# Patient Record
Sex: FEMALE | Race: BLACK OR AFRICAN AMERICAN | NOT HISPANIC OR LATINO | Employment: FULL TIME | URBAN - METROPOLITAN AREA
[De-identification: names, ages, dates, MRNs, and addresses within clinical notes are randomized per-mention and may not be internally consistent; named-entity substitution may affect disease eponyms.]

---

## 2017-05-06 ENCOUNTER — HOSPITAL ENCOUNTER (EMERGENCY)
Facility: OTHER | Age: 22
Discharge: HOME | End: 2017-05-06
Attending: INTERNAL MEDICINE

## 2017-05-06 VITALS
HEART RATE: 80 BPM | WEIGHT: 154 LBS | TEMPERATURE: 99 F | HEIGHT: 67 IN | BODY MASS INDEX: 24.17 KG/M2 | SYSTOLIC BLOOD PRESSURE: 119 MMHG | DIASTOLIC BLOOD PRESSURE: 87 MMHG | RESPIRATION RATE: 17 BRPM | OXYGEN SATURATION: 100 %

## 2017-05-06 DIAGNOSIS — J00 ACUTE NASOPHARYNGITIS: Primary | ICD-10-CM

## 2017-05-06 PROCEDURE — 99283 EMERGENCY DEPT VISIT LOW MDM: CPT

## 2017-05-06 RX ORDER — BENZONATATE 100 MG/1
200 CAPSULE ORAL 3 TIMES DAILY PRN
Qty: 20 CAPSULE | Refills: 0 | Status: SHIPPED | OUTPATIENT
Start: 2017-05-06 | End: 2017-05-16

## 2017-05-06 RX ORDER — FLUTICASONE PROPIONATE 50 MCG
2 SPRAY, SUSPENSION (ML) NASAL DAILY
Qty: 15 G | Refills: 0 | Status: SHIPPED | OUTPATIENT
Start: 2017-05-06 | End: 2023-06-15

## 2017-05-06 RX ORDER — AZELASTINE 1 MG/ML
2 SPRAY, METERED NASAL 2 TIMES DAILY
Qty: 30 ML | Refills: 0 | Status: SHIPPED | OUTPATIENT
Start: 2017-05-06 | End: 2023-06-15

## 2017-05-06 NOTE — ED PROVIDER NOTES
Encounter Date: 5/6/2017       History     Chief Complaint   Patient presents with    Sore Throat     sore throat x 2 days, with ear pain     Review of patient's allergies indicates:  No Known Allergies  Patient is a 22 y.o. female presenting with the following complaint: URI. The history is provided by the patient. No  was used.   URI   The primary symptoms include sore throat and cough.   The sore throat began yesterday. The sore throat has been unchanged since its onset. The sore throat is accompanied by trouble swallowing and hoarse voice. The sore throat is not accompanied by drooling or stridor. The sore throat pain is at a severity of 2/10.   The onset of the illness is associated with exposure to sick contacts. Symptoms associated with the illness include sinus pressure, congestion and rhinorrhea. The illness is not associated with chills or facial pain.     History reviewed. No pertinent past medical history.  History reviewed. No pertinent surgical history.  History reviewed. No pertinent family history.  Social History   Substance Use Topics    Smoking status: Never Smoker    Smokeless tobacco: None    Alcohol use No     Review of Systems   Constitutional: Negative.  Negative for chills.   HENT: Positive for congestion, hoarse voice, postnasal drip, rhinorrhea, sinus pressure, sore throat and trouble swallowing. Negative for drooling and facial swelling.    Eyes: Negative.    Respiratory: Positive for cough. Negative for stridor.    Cardiovascular: Negative.    Gastrointestinal: Negative.    Endocrine: Negative.    Musculoskeletal: Negative.    Skin: Negative.    Allergic/Immunologic: Negative.    Neurological: Negative.    Hematological: Negative.    Psychiatric/Behavioral: Negative.        Physical Exam   Initial Vitals   BP Pulse Resp Temp SpO2   05/06/17 0132 05/06/17 0132 05/06/17 0132 05/06/17 0132 05/06/17 0132   129/87 90 17 99.3 °F (37.4 °C) 99 %     Physical  Exam    Nursing note and vitals reviewed.  Constitutional: She appears well-developed and well-nourished.   HENT:   Head: Normocephalic and atraumatic.   Right Ear: External ear normal.   Left Ear: External ear normal.   Clear postnasal drip with nasal congestion and oropharyngeal erythema without edema or exudate   Eyes: Conjunctivae and EOM are normal. Pupils are equal, round, and reactive to light.   Neck: Normal range of motion. Neck supple.   Cardiovascular: Normal rate and regular rhythm.   Pulmonary/Chest: Breath sounds normal. No respiratory distress. She has no wheezes. She has no rales.   Abdominal: Soft. Bowel sounds are normal.   Musculoskeletal: Normal range of motion.   Neurological: She is alert and oriented to person, place, and time.   Skin: Skin is warm and dry.   Psychiatric: She has a normal mood and affect.         ED Course   Procedures  Labs Reviewed - No data to display          Medical Decision Making:   Initial Assessment:   22-year-old female presents to the emergency department with sore throat ×2 days with cold symptoms  Differential Diagnosis:   Acute nasopharyngitis  Acute strep pharyngitis  Influenza  ED Management:  Patient given prescription for Tessalon Perles, intranasal fluticasone and Astelin    Labs Reviewed  No results found for any previous visit.     Imaging Reviewed    Imaging Results     None          Medications given in ED    Medications - No data to display    Discharge Medications     Discharge Medication List as of 5/6/2017  2:22 AM      START taking these medications    Details   azelastine (ASTELIN) 137 mcg (0.1 %) nasal spray 2 sprays (274 mcg total) by Nasal route 2 (two) times daily., Starting 5/6/2017, Until Sun 5/6/18, Normal      benzonatate (TESSALON) 100 MG capsule Take 2 capsules (200 mg total) by mouth 3 (three) times daily as needed for Cough., Starting 5/6/2017, Until Tue 5/16/17, Normal      fluticasone (FLONASE) 50 mcg/actuation nasal spray 2 sprays by  Each Nare route once daily., Starting 5/6/2017, Until Discontinued, Normal                   Patient discharged to home in stable condition with instructions to:   1. Please take all meds as prescribed.  2. Follow-up with your primary care doctor   3. Return precautions discussed and patient and/or family/caretaker understands to return to the emergency room for any concerns including worsening of your current symptoms, fever, chills, night sweats, worsening pain, chest pain, shortness of breath, nausea, vomiting, diarrhea, bleeding, headache, difficulty talking, visual disturbances, weakness, numbness or any other acute concerns                   ED Course     Clinical Impression:   The primary diagnosis is acute nasopharyngitis  Disposition:   Disposition: Discharged  Condition: Stable       Darian Simon MD  05/06/17 4899

## 2017-05-06 NOTE — DISCHARGE INSTRUCTIONS
Viral Upper Respiratory Illness (Adult)  You have a viral upper respiratory illness (URI), which is another term for the common cold. This illness is contagious during the first few days. It is spread through the air by coughing and sneezing. It may also be spread by direct contact (touching the sick person and then touching your own eyes, nose, or mouth). Frequent handwashing will decrease risk of spread. Most viral illnesses go away within 7 to 10 days with rest and simple home remedies. Sometimes the illness may last for several weeks. Antibiotics will not kill a virus, and they are generally not prescribed for this condition.    Home care  · If symptoms are severe, rest at home for the first 2 to 3 days. When you resume activity, don't let yourself get too tired.  · Avoid being exposed to cigarette smoke (yours or others).  · You may use acetaminophen or ibuprofen to control pain and fever, unless another medicine was prescribed. (Note: If you have chronic liver or kidney disease, have ever had a stomach ulcer or gastrointestinal bleeding, or are taking blood-thinning medicines, talk with your healthcare provider before using these medicines.) Aspirin should never be given to anyone under 18 years of age who is ill with a viral infection or fever. It may cause severe liver or brain damage.  · Your appetite may be poor, so a light diet is fine. Avoid dehydration by drinking 6 to 8 glasses of fluids per day (water, soft drinks, juices, tea, or soup). Extra fluids will help loosen secretions in the nose and lungs.  · Over-the-counter cold medicines will not shorten the length of time youre sick, but they may be helpful for the following symptoms: cough, sore throat, and nasal and sinus congestion. (Note: Do not use decongestants if you have high blood pressure.)  Follow-up care  Follow up with your healthcare provider, or as advised.  When to seek medical advice  Call your healthcare provider right away if  any of these occur:  · Cough with lots of colored sputum (mucus)  · Severe headache; face, neck, or ear pain  · Difficulty swallowing due to throat pain  · Fever of 100.4°F (38°C)  Call 911, or get immediate medical care  Call emergency services right away if any of these occur:  · Chest pain, shortness of breath, wheezing, or difficulty breathing  · Coughing up blood  · Inability to swallow due to throat pain  Date Last Reviewed: 9/13/2015 © 2000-2016 [employer]. [address] [place] [id number]. All rights reserved. This information is not intended as a substitute for professional medical care. Always follow your healthcare professional's instructions.

## 2017-05-06 NOTE — ED AVS SNAPSHOT
[facility] EMERGENCY DEPARTMENT  [address]  [place] [address]               Michelle Burns   2017  1:31 AM   ED    Description:  Female : 1995   Department:  [facility] Emergency Department           Your Care was Coordinated By:     Provider Role From To    Darian Simon MD Attending Provider 17 0140 --      Reason for Visit     Sore Throat           Diagnoses this Visit        Comments    Acute nasopharyngitis    -  Primary       ED Disposition     ED Disposition Condition Comment    Discharge             To Do List           Follow-up Information     Follow up with Primary Doctor No In 3 day(s).       These Medications        Disp Refills Start End    benzonatate (TESSALON) 100 MG capsule 20 capsule 0 2017    Take 2 capsules (200 mg total) by mouth 3 (three) times daily as needed for Cough. - Oral    [facility] [address] [place] [address] AT [facility] Ph #: [phone]       azelastine (ASTELIN) 137 mcg (0.1 %) nasal spray 30 mL 0 2017    2 sprays (274 mcg total) by Nasal route 2 (two) times daily. - Nasal    Pharmacy: [facility] [address] [place], [place] [address] AT [facility] #: [phone]       fluticasone (FLONASE) 50 mcg/actuation nasal spray 15 g 0 2017     2 sprays by Each Nare route once daily. - Each Nare    Pharmacy: [facility] [address] [place], [place] [address] #: [phone]         [facility]     [facility] Care [facility] - 24 Assistance  Unless otherwise directed by your provider, please contact [facility], our nurse care line that is available for 24 assistance.     Registered nurses in the [facility] provide: appointment scheduling, clinical advisement, health education, and other advisory services.  Call: [phone] (toll free)               Medications          "  Message regarding Medications     Verify the changes and/or additions to your medication regime listed below are the same as discussed with your clinician today.  If any of these changes or additions are incorrect, please notify your healthcare provider.        START taking these NEW medications        Refills    benzonatate (TESSALON) 100 MG capsule 0    Sig: Take 2 capsules (200 mg total) by mouth 3 (three) times daily as needed for Cough.    Class: Normal    Route: Oral    azelastine (ASTELIN) 137 mcg (0.1 %) nasal spray 0    Si sprays (274 mcg total) by Nasal route 2 (two) times daily.    Class: Normal    Route: Nasal    fluticasone (FLONASE) 50 mcg/actuation nasal spray 0    Si sprays by Each Nare route once daily.    Class: Normal    Route: Each Nare           Verify that the below list of medications is an accurate representation of the medications you are currently taking.  If none reported, the list may be blank. If incorrect, please contact your healthcare provider. Carry this list with you in case of emergency.           Current Medications     azelastine (ASTELIN) 137 mcg (0.1 %) nasal spray 2 sprays (274 mcg total) by Nasal route 2 (two) times daily.    benzonatate (TESSALON) 100 MG capsule Take 2 capsules (200 mg total) by mouth 3 (three) times daily as needed for Cough.    fluticasone (FLONASE) 50 mcg/actuation nasal spray 2 sprays by Each Nare route once daily.           Clinical Reference Information           Your Vitals Were     BP Pulse Temp Resp Height Weight    129/87 90 99.3 °F (37.4 °C) (Temporal) 17 5' 7" (1.702 m) 69.9 kg (154 lb)    Last Period SpO2 BMI          2017 99% 24.12 kg/m2        Allergies as of 2017     No Known Allergies      Immunizations Administered on Date of Encounter - 2017     None      ED Micro, Lab, POCT     None      ED Imaging Orders     None        Discharge Instructions           Viral Upper Respiratory Illness (Adult)  You have a viral " upper respiratory illness (URI), which is another term for the common cold. This illness is contagious during the first few days. It is spread through the air by coughing and sneezing. It may also be spread by direct contact (touching the sick person and then touching your own eyes, nose, or mouth). Frequent handwashing will decrease risk of spread. Most viral illnesses go away within 7 to 10 days with rest and simple home remedies. Sometimes the illness may last for several weeks. Antibiotics will not kill a virus, and they are generally not prescribed for this condition.    Home care  · If symptoms are severe, rest at home for the first 2 to 3 days. When you resume activity, don't let yourself get too tired.  · Avoid being exposed to cigarette smoke (yours or others).  · You may use acetaminophen or ibuprofen to control pain and fever, unless another medicine was prescribed. (Note: If you have chronic liver or kidney disease, have ever had a stomach ulcer or gastrointestinal bleeding, or are taking blood-thinning medicines, talk with your healthcare provider before using these medicines.) Aspirin should never be given to anyone under 18 years of age who is ill with a viral infection or fever. It may cause severe liver or brain damage.  · Your appetite may be poor, so a light diet is fine. Avoid dehydration by drinking 6 to 8 glasses of fluids per day (water, soft drinks, juices, tea, or soup). Extra fluids will help loosen secretions in the nose and lungs.  · Over-the-counter cold medicines will not shorten the length of time youre sick, but they may be helpful for the following symptoms: cough, sore throat, and nasal and sinus congestion. (Note: Do not use decongestants if you have high blood pressure.)  Follow-up care  Follow up with your healthcare provider, or as advised.  When to seek medical advice  Call your healthcare provider right away if any of these occur:  · Cough with lots of colored sputum  (mucus)  · Severe headache; face, neck, or ear pain  · Difficulty swallowing due to throat pain  · Fever of 100.4°F (38°C)  Call 911, or get immediate medical care  Call emergency services right away if any of these occur:  · Chest pain, shortness of breath, wheezing, or difficulty breathing  · Coughing up blood  · Inability to swallow due to throat pain  Date Last Reviewed: 9/13/2015  © 7184-6785 [employer]. [address] [place] [address], [place] [address]. All rights reserved. This information is not intended as a substitute for professional medical care. Always follow your healthcare professional's instructions.          [facility] Sign-Up     Activating your [facility] account is as easy as 1-2-3!     1) Visit [email or web address], select Sign Up Now, enter this activation code and your date of birth, then select Next.  [email or web address]  Expires: 6/20/2017  2:22 AM      2) Create a username and password to use when you visit [facility] in the future and select a security question in case you lose your password and select Next.    3) Enter your e-mail address and click Sign Up!    Additional Information  If you have questions, please e-mail [email or web address] or call [phone] to talk to our [facility] staff. Remember, [facility] is NOT to be used for urgent needs. For medical emergencies, dial 911.          [facility] Emergency Department complies with applicable Federal civil rights laws and does not discriminate on the basis of race, color, national origin, age, disability, or sex.        Language Assistance Services     ATTENTION: Language assistance services are available, free of charge. Please call [phone].      ATENCIÓN: Si habla español, tiene a mora disposición servicios gratuitos de asistencia lingüística. Llame al [phone].     CHÚ Ý: N?u b?n nói Ti?ng Vi?t, có các d?ch v? h? tr? ngôn ng? mi?n phí dành cho b?n. G?i s? [phone].

## 2020-12-21 ENCOUNTER — OFFICE VISIT (OUTPATIENT)
Dept: URGENT CARE | Facility: CLINIC | Age: 25
End: 2020-12-21
Payer: COMMERCIAL

## 2020-12-21 VITALS
BODY MASS INDEX: 24.17 KG/M2 | DIASTOLIC BLOOD PRESSURE: 70 MMHG | HEART RATE: 66 BPM | HEIGHT: 67 IN | TEMPERATURE: 98 F | OXYGEN SATURATION: 98 % | WEIGHT: 154 LBS | SYSTOLIC BLOOD PRESSURE: 102 MMHG

## 2020-12-21 DIAGNOSIS — R43.0 ANOSMIA: Primary | ICD-10-CM

## 2020-12-21 DIAGNOSIS — Z20.822 EXPOSURE TO COVID-19 VIRUS: ICD-10-CM

## 2020-12-21 DIAGNOSIS — J06.9 UPPER RESPIRATORY TRACT INFECTION, UNSPECIFIED TYPE: ICD-10-CM

## 2020-12-21 LAB
CTP QC/QA: YES
SARS-COV-2 RDRP RESP QL NAA+PROBE: NEGATIVE

## 2020-12-21 PROCEDURE — U0002 COVID-19 LAB TEST NON-CDC: HCPCS | Mod: QW,S$GLB,, | Performed by: NURSE PRACTITIONER

## 2020-12-21 PROCEDURE — 99203 OFFICE O/P NEW LOW 30 MIN: CPT | Mod: S$GLB,,, | Performed by: NURSE PRACTITIONER

## 2020-12-21 PROCEDURE — 99203 PR OFFICE/OUTPT VISIT, NEW, LEVL III, 30-44 MIN: ICD-10-PCS | Mod: S$GLB,,, | Performed by: NURSE PRACTITIONER

## 2020-12-21 PROCEDURE — 3008F PR BODY MASS INDEX (BMI) DOCUMENTED: ICD-10-PCS | Mod: CPTII,S$GLB,, | Performed by: NURSE PRACTITIONER

## 2020-12-21 PROCEDURE — U0002: ICD-10-PCS | Mod: QW,S$GLB,, | Performed by: NURSE PRACTITIONER

## 2020-12-21 PROCEDURE — 3008F BODY MASS INDEX DOCD: CPT | Mod: CPTII,S$GLB,, | Performed by: NURSE PRACTITIONER

## 2020-12-21 NOTE — PROGRESS NOTES
"Subjective:       Patient ID: Michelle Burns is a 25 y.o. female.    Vitals:  height is 5' 7" (1.702 m) and weight is 69.9 kg (154 lb). Her temperature is 98.1 °F (36.7 °C). Her blood pressure is 102/70 and her pulse is 66. Her oxygen saturation is 98%.     Chief Complaint: URI    Pt states for a week she has been having a cough, post nasal drip, nasal congestion, headache. She noticed this AM that she couldn't taste much. Didn't taste or smell her coffee this morning. She was exposed to covid yesterday through a coworker. No fever, chills, CP, SOB.     URI   This is a new problem. The current episode started in the past 7 days. The problem has been gradually worsening. There has been no fever. Associated symptoms include congestion, coughing, headaches and sneezing. Pertinent negatives include no ear pain, nausea, rash, sinus pain, sore throat, vomiting or wheezing. She has tried nothing for the symptoms.       Constitution: Negative for chills, sweating, fatigue and fever.   HENT: Positive for congestion and postnasal drip. Negative for ear pain, sinus pain, sinus pressure, sore throat and voice change.    Neck: Negative for painful lymph nodes.   Eyes: Negative for eye redness.   Respiratory: Positive for cough. Negative for chest tightness, sputum production, bloody sputum, COPD, shortness of breath, stridor, wheezing and asthma.    Gastrointestinal: Negative for nausea and vomiting.   Musculoskeletal: Negative for muscle ache.   Skin: Negative for rash.   Allergic/Immunologic: Positive for sneezing. Negative for seasonal allergies and asthma.   Neurological: Positive for headaches.   Hematologic/Lymphatic: Negative for swollen lymph nodes.       Objective:      Physical Exam   Constitutional: She is oriented to person, place, and time. She appears well-developed. She is cooperative.  Non-toxic appearance. She does not appear ill. No distress.   HENT:   Head: Normocephalic and atraumatic.   Ears:   Right Ear: " Hearing, tympanic membrane, external ear and ear canal normal.   Left Ear: Hearing, tympanic membrane, external ear and ear canal normal.   Nose: Nose normal. No mucosal edema, rhinorrhea or nasal deformity. No epistaxis. Right sinus exhibits no maxillary sinus tenderness and no frontal sinus tenderness. Left sinus exhibits no maxillary sinus tenderness and no frontal sinus tenderness.   Mouth/Throat: Uvula is midline, oropharynx is clear and moist and mucous membranes are normal. Mucous membranes are moist. No trismus in the jaw. Normal dentition. No uvula swelling. No oropharyngeal exudate, posterior oropharyngeal edema or posterior oropharyngeal erythema. Oropharynx is clear.   Eyes: Pupils are equal, round, and reactive to light. Conjunctivae and lids are normal. No scleral icterus.   Neck: Trachea normal, normal range of motion, full passive range of motion without pain and phonation normal. Neck supple. No neck rigidity. No edema and no erythema present.   Cardiovascular: Normal rate, regular rhythm, normal heart sounds and normal pulses.   Pulmonary/Chest: Effort normal and breath sounds normal. No respiratory distress. She has no decreased breath sounds. She has no wheezes. She has no rhonchi. She has no rales.   Abdominal: Normal appearance.   Musculoskeletal: Normal range of motion.         General: No deformity.   Lymphadenopathy:     She has no cervical adenopathy.   Neurological: She is alert and oriented to person, place, and time. She exhibits normal muscle tone. Coordination normal.   Skin: Skin is warm, dry, intact, not diaphoretic and not pale. Psychiatric: Her speech is normal and behavior is normal. Judgment and thought content normal.   Nursing note and vitals reviewed.    Results for orders placed or performed in visit on 12/21/20   POCT COVID-19 Rapid Screening   Result Value Ref Range    POC Rapid COVID Negative Negative     Acceptable Yes            Assessment:       1.  "Anosmia    2. Exposure to COVID-19 virus    3. Upper respiratory tract infection, unspecified type        Plan:         Anosmia    Exposure to COVID-19 virus    Upper respiratory tract infection, unspecified type  -     POCT COVID-19 Rapid Screening         Reviewed previous pertinent office visits, PMH, PSH, fam hx  Rapid covid test negative but she just today developed anosmia. Concern for false negative. Recommended she quarantine for now and f/u for retesting if anosmia persists  We discussed that this is likely a viral illness and will not benefit from antibiotics. Symptomatic care recommended.   Advised on return/follow-up precautions. Advised on ER precautions. Answered all patient questions. Patient verbalized understanding and voiced agreement with current treatment plan.    Patient Instructions   You have tested negative for COVID-19 today.  If you did not have a close exposure (as defined below) you can return to your normal daily activities to include social distancing, wearing a mask and frequent handwashing.    A "close exposure" is defined as anyone who has had an exposure (masked or unmasked) to a known COVID -19 positive person within 6 ft for longer than 15 minutes. If your exposure meets this definition, you are required by CDC guidelines to quarantine for at least 7-10 days from time of exposure.    The CDC states that a test can be performed for an asymptomatic patient (someone who does not have any symptoms) after a close exposure, and that a test should be done if you develop symptoms after a close exposure as described above.    Specifically, you can test at day 5 or later if asymptomatic in order to get released from quarantine on day 7 or later.  If you develop symptoms sooner, you should test when your symptoms start.  If you developed symptoms since the exposure, and your test was negative today and less than 5 days from your exposure, you still have to quarantine for 7-10 days from the " date of the exposure.  The 7-10 day quarantine begins from the day you were exposed, not the day of your test.  For example, if your exposure was on a Monday, and you waited until Friday of the same week to get tested and it was negative, your 7-10 day quarantine begins from that Monday, not the Friday you tested negative.    Please note, if you decide to test as an asymptomatic during your quarantine and you are positive, you will be restarting your quarantine and moving from a possible 10 day quarantine (if you do not test), to a 11 day or greater quarantine.

## 2020-12-21 NOTE — LETTER
[address], [address] ? [place], [address] ? Phone [phone] ? Fax [phone]           Return to Work/School    Patient: Michelle Burns  YOB: 1995   Date: 12/21/2020      To Whom It May Concern:     Michelle Burns was in contact with/seen in my office on 12/21/2020. COVID-19 is present in our communities across the state. Not all patients are eligible or appropriate to be tested. In this situation, your employee meets the following criteria:     Michelle Burns has met the criteria for COVID-19 testing and has a NEGATIVE result. However, she has had a close exposure to covid and should quarantine for 7-10 days according to CDC guidelines.      If you have any questions or concerns, or if I can be of further assistance, please do not hesitate to contact me.     Sincerely,      iGfty Montilla NP

## 2020-12-24 ENCOUNTER — OFFICE VISIT (OUTPATIENT)
Dept: URGENT CARE | Facility: CLINIC | Age: 25
End: 2020-12-24
Payer: COMMERCIAL

## 2020-12-24 VITALS
HEIGHT: 67 IN | OXYGEN SATURATION: 100 % | RESPIRATION RATE: 18 BRPM | TEMPERATURE: 98 F | WEIGHT: 154 LBS | DIASTOLIC BLOOD PRESSURE: 77 MMHG | SYSTOLIC BLOOD PRESSURE: 113 MMHG | HEART RATE: 80 BPM | BODY MASS INDEX: 24.17 KG/M2

## 2020-12-24 DIAGNOSIS — R05.9 COUGH: ICD-10-CM

## 2020-12-24 DIAGNOSIS — H65.193 ACUTE MUCOID OTITIS MEDIA OF BOTH EARS: Primary | ICD-10-CM

## 2020-12-24 LAB
CTP QC/QA: YES
CTP QC/QA: YES
POC MOLECULAR INFLUENZA A AGN: NEGATIVE
POC MOLECULAR INFLUENZA B AGN: NEGATIVE
SARS-COV-2 RDRP RESP QL NAA+PROBE: NEGATIVE

## 2020-12-24 PROCEDURE — 87502 INFLUENZA DNA AMP PROBE: CPT | Mod: QW,S$GLB,, | Performed by: PHYSICIAN ASSISTANT

## 2020-12-24 PROCEDURE — U0002: ICD-10-PCS | Mod: QW,S$GLB,, | Performed by: PHYSICIAN ASSISTANT

## 2020-12-24 PROCEDURE — U0002 COVID-19 LAB TEST NON-CDC: HCPCS | Mod: QW,S$GLB,, | Performed by: PHYSICIAN ASSISTANT

## 2020-12-24 PROCEDURE — 99214 OFFICE O/P EST MOD 30 MIN: CPT | Mod: S$GLB,,, | Performed by: PHYSICIAN ASSISTANT

## 2020-12-24 PROCEDURE — 3008F BODY MASS INDEX DOCD: CPT | Mod: CPTII,S$GLB,, | Performed by: PHYSICIAN ASSISTANT

## 2020-12-24 PROCEDURE — 87502 POCT INFLUENZA A/B MOLECULAR: ICD-10-PCS | Mod: QW,S$GLB,, | Performed by: PHYSICIAN ASSISTANT

## 2020-12-24 PROCEDURE — 3008F PR BODY MASS INDEX (BMI) DOCUMENTED: ICD-10-PCS | Mod: CPTII,S$GLB,, | Performed by: PHYSICIAN ASSISTANT

## 2020-12-24 PROCEDURE — 99214 PR OFFICE/OUTPT VISIT, EST, LEVL IV, 30-39 MIN: ICD-10-PCS | Mod: S$GLB,,, | Performed by: PHYSICIAN ASSISTANT

## 2020-12-24 RX ORDER — BENZONATATE 100 MG/1
200 CAPSULE ORAL 3 TIMES DAILY PRN
Qty: 30 CAPSULE | Refills: 1 | Status: SHIPPED | OUTPATIENT
Start: 2020-12-24 | End: 2020-12-31

## 2020-12-24 RX ORDER — CETIRIZINE HYDROCHLORIDE 10 MG/1
10 TABLET ORAL DAILY
Qty: 30 TABLET | Refills: 0 | Status: SHIPPED | OUTPATIENT
Start: 2020-12-24 | End: 2023-06-15

## 2020-12-24 RX ORDER — IPRATROPIUM BROMIDE 21 UG/1
2 SPRAY, METERED NASAL 2 TIMES DAILY
Qty: 30 ML | Refills: 0 | Status: SHIPPED | OUTPATIENT
Start: 2020-12-24 | End: 2023-06-15

## 2020-12-24 RX ORDER — AMOXICILLIN AND CLAVULANATE POTASSIUM 875; 125 MG/1; MG/1
1 TABLET, FILM COATED ORAL 2 TIMES DAILY
Qty: 14 TABLET | Refills: 0 | Status: SHIPPED | OUTPATIENT
Start: 2020-12-24 | End: 2020-12-31

## 2020-12-24 NOTE — PROGRESS NOTES
"Subjective:       Patient ID: iMchelle Burns is a 25 y.o. female.    Vitals:  height is 5' 7" (1.702 m) and weight is 69.9 kg (154 lb). Her temporal temperature is 97.8 °F (36.6 °C). Her blood pressure is 113/77 and her pulse is 80. Her respiration is 18 and oxygen saturation is 100%.     Chief Complaint: URI    Pt c/o uri symptoms for over a week. C/o loss of taste, nasal congestion, PND, and cough. States she for the last 2 days she has also developed ear pain. States she was tested for COVID-19 a couple of days ago and was negative, but states she is currently home quarantining following an exposure from last Friday. She is here today requesting repeat COVID-19 testing and flu testing. States she has not been taking any medications for symptoms. Denies fever, chills, CP, SOB, abdominal pain, N/V/D, rash.    URI   This is a new problem. The problem has been gradually worsening. There has been no fever. Associated symptoms include congestion, coughing, ear pain and rhinorrhea. Pertinent negatives include no abdominal pain, chest pain, diarrhea, dysuria, headaches, nausea, neck pain, rash, sinus pain, sneezing, sore throat, vomiting or wheezing. She has tried nothing for the symptoms. The treatment provided no relief.       Constitution: Negative for chills, sweating, fatigue, fever and generalized weakness.   HENT: Positive for ear pain, congestion and postnasal drip. Negative for sinus pain, sinus pressure, sore throat, trouble swallowing and voice change.    Neck: Negative for neck pain, neck stiffness and painful lymph nodes.   Cardiovascular: Negative for chest pain and palpitations.   Eyes: Negative for eye redness, double vision and blurred vision.   Respiratory: Positive for cough. Negative for chest tightness, sputum production, bloody sputum, COPD, shortness of breath, stridor, wheezing and asthma.    Gastrointestinal: Negative for abdominal pain, nausea, vomiting and diarrhea.   Genitourinary: Negative " for dysuria, frequency and flank pain.   Musculoskeletal: Negative for back pain and muscle ache.   Skin: Negative for rash.   Allergic/Immunologic: Negative for seasonal allergies, asthma, itching and sneezing.   Neurological: Negative for dizziness, light-headedness and headaches.   Hematologic/Lymphatic: Negative for swollen lymph nodes.       Objective:      Physical Exam   Constitutional: She is oriented to person, place, and time. She appears well-developed. She is cooperative.  Non-toxic appearance. She does not appear ill. No distress.      Comments:Patient is sitting pleasantly on exam table in no acute distress. Nontoxic appearing. AAOx3   HENT:   Head: Normocephalic and atraumatic.   Ears:   Right Ear: Hearing, external ear and ear canal normal. Tympanic membrane is erythematous and bulging. A middle ear effusion (mucoid) is present.   Left Ear: Hearing, external ear and ear canal normal. Tympanic membrane is erythematous and bulging. A middle ear effusion (mucoid) is present.   Nose: Congestion present. No rhinorrhea or nasal deformity. No epistaxis. Right sinus exhibits no maxillary sinus tenderness and no frontal sinus tenderness. Left sinus exhibits no maxillary sinus tenderness and no frontal sinus tenderness.   Mouth/Throat: Uvula is midline and mucous membranes are normal. No trismus in the jaw. Normal dentition. No uvula swelling. Posterior oropharyngeal erythema and cobblestoning (PND noted) present. No oropharyngeal exudate, posterior oropharyngeal edema or tonsillar abscesses. No tonsillar exudate.   Eyes: Pupils are equal, round, and reactive to light. Conjunctivae and lids are normal. No scleral icterus.   Neck: Trachea normal, normal range of motion, full passive range of motion without pain and phonation normal. Neck supple. No neck rigidity. No edema and no erythema present.   Cardiovascular: Normal rate, regular rhythm, normal heart sounds and normal pulses.   Pulmonary/Chest: Effort  normal and breath sounds normal. No stridor. No respiratory distress. She has no decreased breath sounds. She has no wheezes. She has no rhonchi. She has no rales.   Abdominal: Normal appearance.   Musculoskeletal: Normal range of motion.         General: No deformity.   Lymphadenopathy:     She has no cervical adenopathy.   Neurological: She is alert and oriented to person, place, and time. She displays no weakness. She exhibits normal muscle tone. Coordination normal.   Skin: Skin is warm, dry, intact, not diaphoretic and not pale. Psychiatric: Her speech is normal and behavior is normal. Mood, judgment and thought content normal.   Nursing note and vitals reviewed.        Results for orders placed or performed in visit on 12/24/20   POCT COVID-19 Rapid Screening   Result Value Ref Range    POC Rapid COVID Negative Negative     Acceptable Yes    POCT Influenza A/B MOLECULAR   Result Value Ref Range    POC Molecular Influenza A Ag Negative Negative, Not Reported    POC Molecular Influenza B Ag Negative Negative, Not Reported     Acceptable Yes        Clinically well appearing, VSS.  Rapid COVID-19 negative. Flu negative.   Patient is currently home quarantining following a close exposure last Friday. Still has 3 days left in isolation.   Will start on augmentin for mucoid OM noted bilaterally. Advised on symptomatic care.  Advised on return/follow-up precautions. Advised on ER precautions. Answered all patient questions. Patient verbalized understanding and voiced agreement with current treatment plan.      Assessment:       1. Acute mucoid otitis media of both ears    2. Cough        Plan:         Acute mucoid otitis media of both ears  -     amoxicillin-clavulanate 875-125mg (AUGMENTIN) 875-125 mg per tablet; Take 1 tablet by mouth 2 (two) times daily. for 7 days  Dispense: 14 tablet; Refill: 0  -     ipratropium (ATROVENT) 0.03 % nasal spray; 2 sprays by Nasal route 2 (two) times  daily.  Dispense: 30 mL; Refill: 0  -     cetirizine (ZYRTEC) 10 MG tablet; Take 1 tablet (10 mg total) by mouth once daily.  Dispense: 30 tablet; Refill: 0    Cough  -     POCT COVID-19 Rapid Screening  -     POCT Influenza A/B MOLECULAR  -     benzonatate (TESSALON PERLES) 100 MG capsule; Take 2 capsules (200 mg total) by mouth 3 (three) times daily as needed for Cough.  Dispense: 30 capsule; Refill: 1      Patient Instructions       Take full course of antibiotics as prescribed.  -  If you are are female and on BCP use additional methods to prevent pregnancy while on the antibiotics and for one cycle after.  -  Take with meals.     Humidifier use at home.  Warm compresses to affected ear  Elevate head on a pillow at night     Increase fluids and rest are important.    Claritin or Zyrtec for allergies and drainage.  Nasal spray for nasal congestion.  You can take prescribed cough suppressant as directed as needed for cough.    Rest and fluids are also important.     Tylenol or ibuprofen can also be used as directed for pain unless you have an allergy to them or medical condition such as stomach ulcers, kidney or liver disease or blood thinners etc for which you should not be taking these type of medications.     If your condition worsens or fails to improve we recommend that you receive another evaluation at the ER immediately or contact your PCP to discuss your concerns or return here. You must understand that you've received an urgent care treatment only and that you may be released before all your medical problems are known or treated. You the patient will arrange for followup care as instructed.       Your test was NEGATIVE for COVID-19 (coronavirus).      You may leave home and/or return to work when the following conditions are met:  · 24 hours fever free without fever-reducing medications AND  · Improved symptoms  · You have not met the conditions of a close contact     What counts as a close  contact?  · You were within 6 feet of someone who has COVID-19 for a total of 15 minutes or more (masked or unmasked).  · You provided care at home to someone who is sick with COVID-19.  · You had direct physical contact with the person (hugged or kissed them).  · You shared eating or drinking utensils.  · They sneezed, coughed, or somehow got respiratory droplets on you.     If you had a close contact:  · If possible, it is recommended that you quarantine for 14 days from the time of contact regardless of your test status.  · If you have no symptoms, quarantine may be stopped early at 10 days, but this carries a small risk of spreading the virus.  · If you have no symptoms and you have a negative COVID test on day 5 or later, quarantine may be stopped after day 7, but this also carries a small risk of spreading the virus.     Additional instructions:  · Social distance per your local guidelines  · Call ahead before visiting your doctor.  · Wear a mask when around others who do not live in your household.  · Cover your coughs and sneezes.  · Wash hands or use hand  often.      If your symptoms worsen or if you have any other concerns, please contact [facility] at [phone].     Sincerely,    Elyse Noble PA-C           Middle Ear Infection (Adult)  You have an infection of the middle ear, the space behind the eardrum. This is also called acute otitis media (AOM). Sometimes it is caused by the common cold. This is because congestion can block the internal passage (eustachian tube) that drains fluid from the middle ear. When the middle ear fills with fluid, bacteria can grow there and cause an infection. Oral antibiotics are used to treat this illness, not ear drops. Symptoms usually start to improve within 1 to 2 days of treatment.    Home care  The following are general care guidelines:  · Finish all of the antibiotic medicine given, even though you may feel better after the first few  days.  · You may use over-the-counter medicine, such as acetaminophen or ibuprofen, to control pain and fever, unless something else was prescribed. If you have chronic liver or kidney disease or have ever had a stomach ulcer or gastrointestinal bleeding, talk with your healthcare provider before using these medicines. Do not give aspirin to anyone under 18 years of age who has a fever. It may cause severe illness or death.  Follow-up care  Follow up with your healthcare provider, or as advised, in 2 weeks if all symptoms have not gotten better, or if hearing doesn't go back to normal within 1 month.  When to seek medical advice  Call your healthcare provider right away if any of these occur:  · Ear pain gets worse or does not improve after 3 days of treatment  · Unusual drowsiness or confusion  · Neck pain, stiff neck, or headache  · Fluid or blood draining from the ear canal  · Fever of 100.4°F (38°C) or as advised   · Seizure  Date Last Reviewed: 6/1/2016  © 6073-3247 [employer] [address] [place] [address], [place] [address]. All rights reserved. This information is not intended as a substitute for professional medical care. Always follow your healthcare professional's instructions.

## 2020-12-24 NOTE — PATIENT INSTRUCTIONS
Take full course of antibiotics as prescribed.  -  If you are are female and on BCP use additional methods to prevent pregnancy while on the antibiotics and for one cycle after.  -  Take with meals.     Humidifier use at home.  Warm compresses to affected ear  Elevate head on a pillow at night     Increase fluids and rest are important.    Claritin or Zyrtec for allergies and drainage.  Nasal spray for nasal congestion.  You can take prescribed cough suppressant as directed as needed for cough.    Rest and fluids are also important.     Tylenol or ibuprofen can also be used as directed for pain unless you have an allergy to them or medical condition such as stomach ulcers, kidney or liver disease or blood thinners etc for which you should not be taking these type of medications.     If your condition worsens or fails to improve we recommend that you receive another evaluation at the ER immediately or contact your PCP to discuss your concerns or return here. You must understand that you've received an urgent care treatment only and that you may be released before all your medical problems are known or treated. You the patient will arrange for followup care as instructed.       Your test was NEGATIVE for COVID-19 (coronavirus).      You may leave home and/or return to work when the following conditions are met:  · 24 hours fever free without fever-reducing medications AND  · Improved symptoms  · You have not met the conditions of a close contact     What counts as a close contact?  · You were within 6 feet of someone who has COVID-19 for a total of 15 minutes or more (masked or unmasked).  · You provided care at home to someone who is sick with COVID-19.  · You had direct physical contact with the person (hugged or kissed them).  · You shared eating or drinking utensils.  · They sneezed, coughed, or somehow got respiratory droplets on you.     If you had a close contact:  · If possible, it is recommended that you  quarantine for 14 days from the time of contact regardless of your test status.  · If you have no symptoms, quarantine may be stopped early at 10 days, but this carries a small risk of spreading the virus.  · If you have no symptoms and you have a negative COVID test on day 5 or later, quarantine may be stopped after day 7, but this also carries a small risk of spreading the virus.     Additional instructions:  · Social distance per your local guidelines  · Call ahead before visiting your doctor.  · Wear a mask when around others who do not live in your household.  · Cover your coughs and sneezes.  · Wash hands or use hand  often.      If your symptoms worsen or if you have any other concerns, please contact [facility] at [phone].     Sincerely,    Elyse Noble PA-C           Middle Ear Infection (Adult)  You have an infection of the middle ear, the space behind the eardrum. This is also called acute otitis media (AOM). Sometimes it is caused by the common cold. This is because congestion can block the internal passage (eustachian tube) that drains fluid from the middle ear. When the middle ear fills with fluid, bacteria can grow there and cause an infection. Oral antibiotics are used to treat this illness, not ear drops. Symptoms usually start to improve within 1 to 2 days of treatment.    Home care  The following are general care guidelines:  · Finish all of the antibiotic medicine given, even though you may feel better after the first few days.  · You may use over-the-counter medicine, such as acetaminophen or ibuprofen, to control pain and fever, unless something else was prescribed. If you have chronic liver or kidney disease or have ever had a stomach ulcer or gastrointestinal bleeding, talk with your healthcare provider before using these medicines. Do not give aspirin to anyone under 18 years of age who has a fever. It may cause severe illness or death.  Follow-up care  Follow up  with your healthcare provider, or as advised, in 2 weeks if all symptoms have not gotten better, or if hearing doesn't go back to normal within 1 month.  When to seek medical advice  Call your healthcare provider right away if any of these occur:  · Ear pain gets worse or does not improve after 3 days of treatment  · Unusual drowsiness or confusion  · Neck pain, stiff neck, or headache  · Fluid or blood draining from the ear canal  · Fever of 100.4°F (38°C) or as advised   · Seizure  Date Last Reviewed: 6/1/2016  © 6771-2268 [employer]. [address] [place] [address]. All rights reserved. This information is not intended as a substitute for professional medical care. Always follow your healthcare professional's instructions.

## 2020-12-29 ENCOUNTER — TELEPHONE (OUTPATIENT)
Dept: URGENT CARE | Facility: CLINIC | Age: 25
End: 2020-12-29

## 2020-12-29 NOTE — TELEPHONE ENCOUNTER
Spoke with patient over the phone. States today is her last day of 10 day quarantine following a close exposure. States she was seen on 12/24/2020 with negative COVID-19 testing. Denies any symptoms at this time. She is requesting note stating she can return to work.     Advised on return/follow-up precautions. Advised on ER precautions. Answered all patient questions. Patient verbalized understanding and voiced agreement with current treatment plan.

## 2021-06-23 ENCOUNTER — CLINICAL SUPPORT (OUTPATIENT)
Dept: URGENT CARE | Facility: CLINIC | Age: 26
End: 2021-06-23
Payer: COMMERCIAL

## 2021-06-23 DIAGNOSIS — Z20.822 COVID-19 RULED OUT: Primary | ICD-10-CM

## 2021-06-23 LAB
CTP QC/QA: YES
SARS-COV-2 RDRP RESP QL NAA+PROBE: NEGATIVE

## 2021-06-23 PROCEDURE — U0002: ICD-10-PCS | Mod: QW,S$GLB,, | Performed by: PHYSICIAN ASSISTANT

## 2021-06-23 PROCEDURE — U0002 COVID-19 LAB TEST NON-CDC: HCPCS | Mod: QW,S$GLB,, | Performed by: PHYSICIAN ASSISTANT

## 2021-07-01 ENCOUNTER — PATIENT MESSAGE (OUTPATIENT)
Dept: ADMINISTRATIVE | Facility: OTHER | Age: 26
End: 2021-07-01

## 2023-06-15 ENCOUNTER — OFFICE VISIT (OUTPATIENT)
Dept: OBSTETRICS AND GYNECOLOGY | Facility: CLINIC | Age: 28
End: 2023-06-15
Payer: COMMERCIAL

## 2023-06-15 ENCOUNTER — LAB VISIT (OUTPATIENT)
Dept: LAB | Facility: HOSPITAL | Age: 28
End: 2023-06-15
Attending: PHYSICIAN ASSISTANT
Payer: COMMERCIAL

## 2023-06-15 ENCOUNTER — TELEPHONE (OUTPATIENT)
Dept: OBSTETRICS AND GYNECOLOGY | Facility: CLINIC | Age: 28
End: 2023-06-15
Payer: COMMERCIAL

## 2023-06-15 VITALS — SYSTOLIC BLOOD PRESSURE: 110 MMHG | WEIGHT: 169.19 LBS | DIASTOLIC BLOOD PRESSURE: 70 MMHG | BODY MASS INDEX: 26.5 KG/M2

## 2023-06-15 DIAGNOSIS — N91.4 SECONDARY OLIGOMENORRHEA: ICD-10-CM

## 2023-06-15 DIAGNOSIS — N91.4 SECONDARY OLIGOMENORRHEA: Primary | ICD-10-CM

## 2023-06-15 LAB
B-HCG UR QL: POSITIVE
CTP QC/QA: YES
HCG INTACT+B SERPL-ACNC: NORMAL MIU/ML

## 2023-06-15 PROCEDURE — 3008F PR BODY MASS INDEX (BMI) DOCUMENTED: ICD-10-PCS | Mod: CPTII,S$GLB,, | Performed by: PHYSICIAN ASSISTANT

## 2023-06-15 PROCEDURE — 87591 N.GONORRHOEAE DNA AMP PROB: CPT | Performed by: PHYSICIAN ASSISTANT

## 2023-06-15 PROCEDURE — 3078F DIAST BP <80 MM HG: CPT | Mod: CPTII,S$GLB,, | Performed by: PHYSICIAN ASSISTANT

## 2023-06-15 PROCEDURE — 99999 PR PBB SHADOW E&M-EST. PATIENT-LVL III: CPT | Mod: PBBFAC,,, | Performed by: PHYSICIAN ASSISTANT

## 2023-06-15 PROCEDURE — 81025 URINE PREGNANCY TEST: CPT | Mod: S$GLB,,, | Performed by: PHYSICIAN ASSISTANT

## 2023-06-15 PROCEDURE — 1159F MED LIST DOCD IN RCRD: CPT | Mod: CPTII,S$GLB,, | Performed by: PHYSICIAN ASSISTANT

## 2023-06-15 PROCEDURE — 36415 COLL VENOUS BLD VENIPUNCTURE: CPT | Performed by: PHYSICIAN ASSISTANT

## 2023-06-15 PROCEDURE — 99214 OFFICE O/P EST MOD 30 MIN: CPT | Mod: S$GLB,,, | Performed by: PHYSICIAN ASSISTANT

## 2023-06-15 PROCEDURE — 1160F PR REVIEW ALL MEDS BY PRESCRIBER/CLIN PHARMACIST DOCUMENTED: ICD-10-PCS | Mod: CPTII,S$GLB,, | Performed by: PHYSICIAN ASSISTANT

## 2023-06-15 PROCEDURE — 3008F BODY MASS INDEX DOCD: CPT | Mod: CPTII,S$GLB,, | Performed by: PHYSICIAN ASSISTANT

## 2023-06-15 PROCEDURE — 3078F PR MOST RECENT DIASTOLIC BLOOD PRESSURE < 80 MM HG: ICD-10-PCS | Mod: CPTII,S$GLB,, | Performed by: PHYSICIAN ASSISTANT

## 2023-06-15 PROCEDURE — 86900 BLOOD TYPING SEROLOGIC ABO: CPT | Performed by: PHYSICIAN ASSISTANT

## 2023-06-15 PROCEDURE — 86901 BLOOD TYPING SEROLOGIC RH(D): CPT | Mod: 91 | Performed by: PHYSICIAN ASSISTANT

## 2023-06-15 PROCEDURE — 1160F RVW MEDS BY RX/DR IN RCRD: CPT | Mod: CPTII,S$GLB,, | Performed by: PHYSICIAN ASSISTANT

## 2023-06-15 PROCEDURE — 1159F PR MEDICATION LIST DOCUMENTED IN MEDICAL RECORD: ICD-10-PCS | Mod: CPTII,S$GLB,, | Performed by: PHYSICIAN ASSISTANT

## 2023-06-15 PROCEDURE — 84702 CHORIONIC GONADOTROPIN TEST: CPT | Performed by: PHYSICIAN ASSISTANT

## 2023-06-15 PROCEDURE — 99999 PR PBB SHADOW E&M-EST. PATIENT-LVL III: ICD-10-PCS | Mod: PBBFAC,,, | Performed by: PHYSICIAN ASSISTANT

## 2023-06-15 PROCEDURE — 87086 URINE CULTURE/COLONY COUNT: CPT | Performed by: PHYSICIAN ASSISTANT

## 2023-06-15 PROCEDURE — 3074F PR MOST RECENT SYSTOLIC BLOOD PRESSURE < 130 MM HG: ICD-10-PCS | Mod: CPTII,S$GLB,, | Performed by: PHYSICIAN ASSISTANT

## 2023-06-15 PROCEDURE — 3074F SYST BP LT 130 MM HG: CPT | Mod: CPTII,S$GLB,, | Performed by: PHYSICIAN ASSISTANT

## 2023-06-15 PROCEDURE — 81025 POCT URINE PREGNANCY: ICD-10-PCS | Mod: S$GLB,,, | Performed by: PHYSICIAN ASSISTANT

## 2023-06-15 PROCEDURE — 99214 PR OFFICE/OUTPT VISIT, EST, LEVL IV, 30-39 MIN: ICD-10-PCS | Mod: S$GLB,,, | Performed by: PHYSICIAN ASSISTANT

## 2023-06-15 RX ORDER — ONDANSETRON 4 MG/1
4 TABLET, FILM COATED ORAL DAILY PRN
Qty: 30 TABLET | Refills: 1 | Status: SHIPPED | OUTPATIENT
Start: 2023-06-15 | End: 2023-12-28

## 2023-06-15 NOTE — TELEPHONE ENCOUNTER
----- Message from Alyx Pankaj sent at 6/14/2023  2:25 PM CDT -----  Regarding: self [phone]  .Type: Patient Call Back    Who called: self     What is the request in detail: Pt stated that she's having a hard time keeping stuff down and feeling nauseas. Pt is requesting to know what she can take otc     Can the clinic reply by [facility]? Call back     Would the patient rather a call back or a response via [facility]?  Call back     Best call back number: .[phone]        
Pt call was returned. Appointment was scheduled. Pt verbalized understanding.       
Statement Selected

## 2023-06-15 NOTE — PROGRESS NOTES
CC: Positive Pregnancy Test    HISTORY OF PRESENT ILLNESS:    Michelle Burns is a 28 y.o. female, ,  Presents today for a routine exam complaining of oligomenorrhea and positive home urine pregnancy test.  Patient's last menstrual period was 2023 (exact date).   She is not currently on any contraception. UPT is Positive. Pregnancy is desired. Last period was normal. She is taking a PNV.  She reports nausea/vomiting. Current symptoms also include: breast tenderness. Denies vaginal bleeding and pelvic pain.  Mother has h/o DM and HTN.   Denies PMH, abdominal surgeries, medications other than PNV, genetic family history (SC/CF). No personal history of DM. No personal or family history of thyroid disease. Does not use alcohol, tobacco, or illicit drugs. Feels safe at home.     OB history:     Prior pregnancies:   1 FT C/S  complicated by fetal intolerance and anemia  2 miscarriages in the last year     Last Pap:  result nilm - HPV  2019 nilm   2020= ASCUS (+) HR HPV. Colpo -- 2020= LORENA I.   2020= ASCUS (+) HR HPV.   3/11/2021 LGSIL (+)  HR HPV & Colpo (-) Dysplasia.   2021 = WNL (-) HPV    ROS:  GENERAL: No weight changes. No swelling. No fatigue. No fever.  CARDIOVASCULAR: No chest pain. No shortness of breath. No leg cramps.   NEUROLOGICAL: No headaches. No vision changes.  BREASTS: No pain. No lumps. No discharge.  ABDOMEN: No pain. No diarrhea. No constipation.  REPRODUCTIVE: No abnormal bleeding.   VULVA: No pain. No lesions. No itching.  VAGINA: No relaxation. No itching. No odor. No discharge. No lesions.  URINARY: No incontinence. No nocturia. No frequency. No dysuria.    MEDICATIONS AND ALLERGIES:  Reviewed    COMPREHENSIVE GYN HISTORY:  PAP History: Denies abnormal Paps.  Infection History: Denies STDs. Denies PID.  Benign History: Denies uterine fibroids. Denies ovarian cysts. Denies endometriosis. Denies other conditions.  Cancer History: Denies cervical  cancer. Denies uterine cancer or hyperplasia. Denies ovarian cancer. Denies vulvar cancer or pre-cancer. Denies vaginal cancer or pre-cancer. Denies breast cancer. Denies colon cancer.  Sexual Activity History: Reports currently being sexually active  Menstrual History: None.  Contraception: None    /70   Wt 76.8 kg (169 lb 3.3 oz)   LMP 2023 (Exact Date)   BMI 26.50 kg/m²     PE:  AFFECT: Calm, alert and oriented X 3. Interactive during exam  GENERAL: Appears well-nourished, well-developed, in no acute distress.  HEAD: Normocephalic, atruamatic  TEETH: Good dentition.  SKIN: Normal for race, warm, & dry. No lesions or rashes.  LUNGS: Easy and unlabored  ABDOMEN: Soft and nontender without masses or organomegally.  VULVA: No lesions, masses or tenderness.  VAGINA: Moist and well rugated without lesions or discharge.  CERVIX: Moist and pink without lesions, discharge or tenderness.      UTERUS SIZE: 7 week size, nontender and without masses.  ADNEXA: No masses or tenderness.  EXTREMITIES: No cyanosis, clubbing or edema. No calf tenderness.  LYMPH NODES: No axillary or inguinal adenopathy.    ASSESSMENT/PLAN:  Amenorrhea  Positive urine pregnancy test (EMILI: 25, EGA: 6w3d based on LMP:)    -  Routine prenatal care    Nausea and vomiting in pregnancy    -  Education regarding lifestyle and dietary modifications    -  Advised use of B6/Unisom. Pt will notify us if no relief/worsening symptoms, will consider alternative therapies prn    1st TRIMESTER COUNSELING: Discussed all, booklet provided:  Common complaints of pregnancy  HIV and other routine prenatal tests including  genetic screening  Risk factors identified by prenatal history  Oriented to practice - discussed anticipated course of prenatal care & indications for Ultrasound  Childbirth classes/Hospital facilities   Nutrition and weight gain counseling  -- Discussed IOM recommended weight gain of:          Underweight        Less than  18.5            28-40            Normal Weight     18.5-24.9                    25-35            Overweight          25-29.9                       15-25            Obese                  30 and greater             11-20    -- Discussed criteria for delivery at [facility] r/t excessive pre-preg weight or excessive weight gain:          Pre-pregnancy BMI over 40 or excess pregnancy weight gain defined as:          Pre-preg BMI < 18.5; Excess weight gain = > 60 pound          Pre-preg BMI 18.5-24.9;  Excess weight gain = > 53 pounds          Pre-preg BMI 25-29.9;  Excess weight gain = > 38 pounds          Pre-preg BMI > 30;  Excess weight gain = > 30 pounds  Toxoplasmosis precautions (Cats/Raw Meat)  Sexual activity and exercise  Environmental/Work hazards  Travel  Tobacco (Ask, Advise, Assess, Assist, and Arrange), as well as alcohol and drug use  Use of any medications (Including supplements, Vitamins, Herbs, or OTC Drugs)  Domestic violence  Seat belt use      TERATOLOGY COUNSELING:   Discussed indications and options for aneuploidy screening - pamphlets given  [facility] US ORDERED  Indications for low-dose aspirin use after 12 weeks for the prevention of pre-eclampsia reviewed.  For this patient, her high risk factors include None. Her moderate risk factors include:  Sociodemographic characteristics: Black or .  Patient is not a candidate for low-dose aspirin.  Anatomy US 19-20 weeks   Routine serum and urine prenatal labs today  FOLLOW-UP in 4 weeks with MADELIN IyerC    OB/GYN

## 2023-06-15 NOTE — PATIENT INSTRUCTIONS
Clinic Location:   [address] [facility] [place] [address]  [phone]    Your Pregnancy A to Z   Allergies - Seasonal   Benadryl   Claritin   Zyrtec   If you have a skin allergic reaction, call your doctor    Baby Movement Counts  Baby movement is an indicator of your baby's health and wellbeing. A movement may be a kick, stretch, or turn. You will begin counting baby movements after you reach 28 weeks gestation. Do these counts twice a day (AM and PM). Several things can affect your baby's activity, such as see (20-40 minutes time), your blood sugar levels, smoking, noise level, drugs, gestational age, placental location, decreasing space in the uterus, and time of the day.   Call your doctor if your baby has NOT had 5 movements in 1 hour or 10 movements in 2 hours or there is a significant decrease in your baby's movements.     Backache  Almost all pregnant women have backaches in pregnancy. These are often related to stretching of ligaments that hold the uterus in place. Backaches can also be cause by stretching of the back muscles that support the weight as your baby grows. Here are some comfort measures:   Maternity Belt   Warm heating pad   Warm bath   Regular strength Tylenol   Massage     Breastfeeding  Your doctor should discuss breastfeeding before delivery. It is the best nutrition for your baby. It also protects your baby from illnesses. Studies have shown that  babies get sick less often. When they reach school age,  babies perform better in school. Newborns tolerate breast milk better than formula. It also decreases you bleeding after delivery by shrinking your uterus. It is a natural process, but often takes some time for you and your new infant to get the hang of things. Here are some helpful hints:   Try breastfeeding as soon as baby is born. Starting immediately increases the success of breastfeeding and creates a bond between you and your baby.   Do not get discouraged! Most women  don't produce a significant amount of milk until days 3- after delivery. Continue taking your prenatal vitamins while breastfeeding. Stay well hydrated by drinking 8-10 glasses of water every day.   When breastfeeding, if you have fever, redness of breasts or fullness that isn't relieved by pumping or expressing your milk, contact your doctor. Breast feeding is not recommended for women with certain medical conditions.     Breast Tenderness   This is common in pregnancy, especially in the beginning. The tenderness is related to the hormone changes that occur in the 1st trimester. Your breasts become larger. You may also notice darkening of the nipples. As your pregnancy continues, it's not uncommon to product milk, even before you deliver. To relieve tenderness and heaviness, wear a good support bra.     Colds and Congestion   This can be normal. To relieve congestion, you may use ocean nasal spray, a saline nasal spray, or Sudafed (without PE) sparingly. Do not use antihistamines because they may make your congestion worse. You can also try using a humidifier.   Medications ok in pregnancy: Plain Robitussin, plain Sudafed, Actifed, Benadryl, Claritin, Mucinex, Zyrtec.   DO NOT use any DM medications.     Constipation   This is very common throughout all stages of pregnancy. It can be caused by hormones that relax the muscles in your digestive system. Iron often taken during pregnancy may make this worse. In addition, the growing uterus presses on the lower intestines which may add to the problem. Here are some ways to relieve constipation:   Drink plenty of water (8-10 glasses a day)   Eat foods high in fiber such as raw fruits, vegetables, wholegrain breasts, and cereals.   Eat fruit, especially at night, such as prunes, figs, dates, raisins, peaches, and cherries because of their laxative properties.   Avoid cheese, bananas, and rice as they may slow down your bowel movements.   Use Metamucil or Citrucel as  needed. You can also try Senekot, GoodSense Fiber, Miralax.  Try a stool softener such as Colace.  Minimal use of Milk of Magnesia, Lactulose, or Dulcolax   If you go 5 days without any form of a bowel movement or have significant pain, contact your doctor.     Contractions   Cramping or [place] contractions are common in the 2nd and 3rd trimesters. Make sure you stay well hydrated. You may also find comfort from a warm bath.   If you experience contractions every 5 minuets apart or closer for 2 hours, come to the hospital for evaluation.      Cough   For relief from cough, you can try regular/plain Robitussin, Chloraseptic spray, or any throat lozenges.     Cramping   Women commonly have abdominal cramping throughout their pregnancies. Cramping associated with pregnancy is often described as feeling similar to menstrual cramps. Early in the pregnancy, mild abdominal cramping is due to the growing uterus. Cramping can also be due to round ligament pain. In the late 2nd trimester and 3rd trimester, you may experience cramping due to [place] Perez. These are contractions of the uterus but are not associated with labor. They can be worse with activity or when you are dehydrated. Make sure you drink 8-10 glasses of water every day. If the cramping becomes more intense or you experience the cramping every 5 minutes apart or closer for 2 hours, come to the hospital for evaluation.     Cues  Babies don't feed at regular intervals. Instead, babies use cutes to tell you when they are hungry and full. You can tell when your baby is starting to get hungry when you see him or her stretch or begin to wake up. Ten, your baby may begin to bring hands to mouth, smack lips or stick tongue out hopefully you have started to feed by now. If not, baby may start to cry, which makes feeding very difficult. When mothers respond to feeding cues, baby eats more frequently. More frequent feedings increase moms milk supply. More milk  means that baby will be happier and healthier, and mom will be more confident.     Dental Procedures  Bleeding gums are common during pregnancy. However, if you have painful gums or teeth, speak with your dentist.   Most dental procedures can be done safely in pregnancy with a local anesthetic.   Keep your teeth healthy during pregnancy by brushing twice a day, using dental floss, and having regular dental exams and cleanings.     Diarrhea  Your gastrointestinal tract may be more sensitive during pregnancy. That sensitivity can cause diarrhea after you eat certain foods.   Relieve diarrhea with Imodium or Kaopectate. Stay well hydrated. If you have blood or mucus in your stool, call your doctor.     Diet   Maintain a healthy diet throughout pregnancy by eating grains, fruits, vegetables, dairy products, meats, and beans. Avoid fatty greasy, and fried foods. Eat small, frequent meals throughout the day and NEVER skip breakfast. There are some foods you want to avoid during pregnancy:   Cheese such as brie, Gouda, and feta. These soft cheeses are not completely pasteurized and contain bacteria that can be harmful to your baby.   Shark, swordfish, logan mackerel, and tilefish can be high in mercury. Limit canned tuna and salmon to once per week.   Packaged meat such as ham, bologna, and hot dogs. They contain bacteria that can be harmful. Eat them only if they are fully cooked. Raw meat and raw fish.   More information on foods below    Dizziness/Faintness  This is common during pregnancy when standing for long periods of time. You may also experience this when changing positions, such as moving from sitting to standing. This usually occurs in the 2nd trimester. Shah sure you drink plenty of water and avoid standing for longer periods of time. If it does not improve, contact your doctor.     Exclusive Breastfeeding  Mothers milk has everything a baby needs for the first six months of life. It make take time to learn to  breastfeed, but you have the support you need to be successful. All major health organizations recommend excusive breastfeeding for 6 months. This means no water, juice, tea, rice cereal, or solids for baby until after six months.     Exercise and Physical cavity   You can and should exercise during pregnancy but do not start a new rigorous activity. When you are exercising, your heart rate should not exceed 140 eats per minute. Do not exercise for more than 15 minutes in areas that are hot, humid, or not well ventilated. After the 4th month of pregnancy, avoid exercises that require you to lie on your back. Avoid exercises that will cause trauma to your abdomen, such as horseback riding, downhill skiing, wrestling, etc. swimming is permissible, but diving is not. If you experience excessive contractions, bleeding, loss of fluid, or decreased fetal movement, come to the hospital immediately.     Frequent Urination   Hormone changes at the beginning of pregnancy increase the frequency of urination. This is normal. Pressure from the uterus and the baby at the end of pregnancy decreases the capacity of the bladder - also leading to frequent urination. Because of the increased pressure, it's not uncommon to lose urine unexpectedly.     Gas and Bloating  It is common to have gas and bloating during pregnancy. Here are things to do to help:   Recognize the foods that give you gas and avoid eating them  Eat small, frequent meals instead of big, heavy meals   Don't eat fired, fatty, and greasy foods  Avoid constipation     Hair  Often, the hormonal changes during pregnancy cause your hair to break. You may also notice increased shedding during the post-partum period. These changes are normal.   Relaxer, perms, and hair dyes can be applied after the first trimester.     Headaches   There are different causes for headaches during pregnancy:   Tension Headaches: pain usually in the back and sides of the head that becomes worse  with stress. These are best treated by taking regular strength Tylenol, drinking plenty of water, and resting.   Sinus Headaches: Pain under eyes or around the face. Best relieved with regular strength Tylenol, alternating cold and warm compresses, or a humidifier.   Migraine Headaches: Often accompanied with nausea or vomiting. Light and sound make migraine pain worse. Tylenol may help with this pain. If you experience this, consult your doctor.   Tips to help with headaches:   Magnesium oxide 400 mg nightly  Check your Prenatal Vitamin and ensure you it has at least 400 mg of Vitamin B2. If it does not get OTC Vitamin B2 as well.    Riboflavin 400 mg daily  Caffeine (during the day, <200 mg daily)  Benadryl or OTC Melatonin 1-2 mg nightly (before bedtime).   2 extra strength Tylenol or 1-2 tablets (do not exceed 3000 mg tylenol daily)  Gatorade  Migraine frequency and severity should start to level off and improve during the second trimester as estrogen levels normalize.  Please contact me in 1-2 weeks to update me on any changes for better or worse.  Any time you experience a headache associated with blurry vision or a headache that's not relieved with Tylenol, contact your doctor. This may be cause by elevated blood pressure.     Heartburn   Pregnancy hormonal changes slow down your digestive system and the stomach takes longer to empty. This increases the production of gastric juices that can lead to heartburn. Hear are some ways to ease the pain:   Eat small, more frequent meals  Avoid spicy foods  Avoid fried, fatty, spicy food   Avoid irritants such as citrus juices, tomatoes, and sodas  Avoid alcohol, mini, coffee, and strong tea   Remain upright for at least one hour after eating   Medications: Tums, Rolaids, carafate, Mylanta with Simethicone, Gas-X. Pepcid AC, Prilosec OTC 20mg, Prevacid, Protonix.   Take over the counter Pepcid twice a day      Heart Palpitations  During pregnancy, you may feel as  though your heart is racing or skipping a beat. These can be normal but if associated with chest pain, shortness of breath, or fatigue, contact your doctor immediately.     Insomnia  Benadryl 25mg, Tylenol PM, Unisom     Hemorrhoids  As your pregnancy continues and you have more pelvic pressure, you may develop hemorrhoids. These can be painful. Here are tips to help with the pain:   Avoid constipation (see above).   Use hemorrhoid creams such as Annusol or Preparation H.   Use astringents such as Tucks Medicated Pads  If your pain persists or your experience excessive bleeding, contact your doctor.     Latch   When it comes to latching on for breastfeeding, only 2 things matter: comfort and effectiveness. Breastfeeding is part instinct, and part learning. Our staff will help the learning to make sure there is no pain and baby is getting milk     Leg Cramps   Muscle spasms in the calf, especially at night, are common during pregnancy. Try massaging the calves, stretching or applying a warm heating pad. If your leg cramps do not improve or only occur in one leg, go to the hospital.     Miscarriage   Unfortunately, this is the unhappy side of pregnancy and is very common. This is not your fault or your partner's fault. Most of the time, miscarriage is the results of genetic information not coming together in the right way. It will not affect your next pregnancy. However, if you have had 3 or more miscarriages, discuss this with your doctors. Also, you should be aware of the signs of a miscarriage:   Bleeding during pregnancy is a result of increased dilation of blood vessels. However, if you have bleeding that soaks through a sanitary pad, contact your doctor.   Cramping can be a sign of growth. However, if it is associated with bleeding, contact your doctor.   Pregnancy loss is a difficult life event. If you are having difficult coping, speak to your doctor or nurse about support groups or counselors.     Mood  Swings  You may be happy one minute and sad the next. Mood swings are a normal part of pregnancy and caused by hormones. However, if you are extremely sad, cry a lot, cannot sleep, are not eating or feel like hurting yourself or someone else, call your doctor immediately.     Nausea and Vomiting  During the early stages of pregnancy, these symptoms are common. These usually stop in the 2nd trimester. Here are some things you can do to reduce feeling nauseous.   Diet Modifications: Eat before or as soon as you feel hungry to avoid an empty stomach, which can aggravate nausea. Eat crackers or dry toast before sitting up in the morning. A snack before getting out of bed in the morning and snacks during the night may be helpful. Eat meals slowly and in small amounts every 1-2 hours to avoid an overly full stomach. Eliminate coffee and spicy, odorous, high-fat, acidic, or very sweet foods, and instead eat protein-dominant, salty, low-fat, bland, and/or dry meals. These include things like nuts, pretzels, crackers, cereal, toast. Drink fluids 30 minutes before or after food. Try drinking cold, clear, carbonated, or sour fluids in small amounts.    Avoid sudden movements. Get out of bed slowly.   Stay hydrated. If you cannot tolerate water, try Sprite.   Try ginger in any form: ginger ale, ginger snaps, or ginger tablets.   The only category A medication for nausea in pregnancy is doxylamine, but insurance coverage for the brand medication is still poor. It is available over-the-counter in a form of Unisom 25 mg.  The other component of Diclegis is Vitamin B6 (also known as pyridoxine), also available over-the-counter.  You could take 1/2 tablet of the Unisom in the morning and a full tablet in the evening (to minimize sleepiness during the day).  Take 25 mg of the pyridoxine every 6-8 hours. You can also try Emetrol.   If your vomiting persists for more than 24 hours, check with your doctor for further advice.      Nosebleeds  Because of increased dilation of the blood vessels during pregnancy, nosebleeds can occur. This condition does not usually require medical treatment. However, if the bleeding persists, contact your doctor.     Pain  Labor is hard work. When you're making decisions about how to hand birth pain, talk with your nurses and doctors about techniques that will not interfere with breastfeeding. For example, select a support person to be with you during labor, and practice ways of moving and massaging that help you feel comfortable.     Preeclampsia   This is a disorder during pregnancy in which your blood pressure goes up above limits that are normal for you. This can be dangerous if not monitored. Possible complications are seizure, stroke, placental abruption, pulmonary edema, kidney failure, and baby's death. Notify your doctor if you have:   Sudden weight gain of 5 or more pounds in one week   Generalized swelling that appears quickly   Decreased urine   Headaches that don't go away with Tylenol  Chest pain  Shortness of breath   Vision changes   If you have these symptoms with a blood pressure of >140/90 or you do not have these symptoms, but your blood pressure is >160/100 go to labor and delivery if you are >20 weeks.     Prenatal Visits  Prenatal care is essential to having a happy, healthy pregnancy. During your visits, your provider will listen to the baby's heart (after 12 weeks gestation) and make sure your baby is growing appropriately. You will have different laboratory tests performed, including your blood type, checking for anemia, and testing for infections. You will need to see a provider every 4 weeks until you are 28 weeks pregnant. Then you will see your provider every 2 weeks until you are 35 weeks pregnant. After 35 weeks, you will be seen every week until you deliver.      Labor   labor occurs when regular contractions begin to open our cervix before 37 weeks of  pregnancy. A full-term pregnancy should last about 40 weeks. If  labor can't be stopped, your baby will be born early.  The good news is that doctors can do a lot to delay an early delivery. The longer your baby gets to grow inside you, the less likely he or she is to have problems after birth. Here are some risks for  birth:   Pervious  labor and delivery  Abnormally shaped uterus or uterine surgery   Two or more second trimester miscarriages or abortions   Incompetent cervix, cone biopsy, large fibroid   Current pregnancy with twins, triplets, etc.   Severe urinary tract infection or kidney infection   Vaginal bleeding, placenta previa   Too much or too little amniotic fluid     Rooming In   This is when mom and baby are together in the same room throughout their whole stay. The doctors and nurses provide all clinical care in the room, except for some procedures that need to e done in another unit. Babies feel safe and secure when they are near the people who love them. Mother and babies sleep better quality when rooming in. nurses are more available to be with you and your baby in your room because they are not busy taking care of a nursery full of babies. They will help you with feeding, diapering, bathing, etc. This way, when you go home, you will feel confident.     Round Ligament Pain   There are 2 ligaments (a band of tough, flexible, fibrous connective tissue) from the front of the uterus an end in the vagina. These are the round ligaments.as the uterus grows and stretches, these ligaments stretch. Pain is often associated with this stretching. It can be sharp, stabbing pain usually in the lower pelvis or vagina. This pain is worse when you move from sitting to standing or walk for long periods. You can help this pain with the following:   Using a warm (not hot) heating pad  Warm bath   Regular strength Tylenol   Maternity belt   Staying well hydrated.     Salivation and spitting   Some  women experience increased salivation during pregnancy. This is known as ptyalism. Decrease your saliva by using non-mediated throat lozenges, sucking candy such as peppermint or eating crackers. This may stop in 2nd trimester but may continue throughout pregnancy.     Sexual intercourse  In most cases, it is safe to continue sexual intercourse throughout pregnancy. You may find a decreased or increase desire during pregnancy, and this is normal. Avoid sex if you are having bleeding, your water bag is ruptured, or you have been diagnosed with placenta previa or an incompetent cervix.     Shortness of Breath  Toward the end of pregnancy, many women experience shortness of breath. The uterus is getting bigger, and the diaphragm is unable to lower, making it feel like you are unable to catch your breath. However, if you experience wheezing, inability to catch your breath, dizziness or your blood pressure is elevated, call your doctor.     Skin Changes  Hormone changes in pregnancy affect the melanocytes and cause darkening of several areas of the body. Some women's faces darken causing a pregnancy mask. Some have darkening around the areola around the nipple. Often a dark line appears on the abdomen from the belly button to the pubic area. Stretch marks may also form. Most of the skin changes will fade during pregnancy. You may minimize the appearance of stretch marks by using cocoa butter lotion, vitamin E oil and other over the counter products.     Skin to Skin   The first few hours after birth are sacred. As soon as your baby is born, she or he will be dried ad placed on you for the first hug! As long as everyone is healthy, you will get to be skin to skin through your first feeding and for at least an hour. Baby has been growing inside of you and really needs to stay close after birth in order to adjust to life in the outside world. Feeling your warmth, hearing your heartbeat and voice, and receiving your milk  will all ensure a smooth transition. Other loved ones will enjoy holding the baby after this important adjustment period.     Support  We are here for you. With the right support, al mother can breastfeed. Our team of doctors, nurses, and lactation consultants can help you met your goals. Our job is to help you make informed decisions and to make sure you have the support you need to meet your goals.     Swelling  As the uterus gets larger, it lies on the inferior vena cava, diminishing the return of blood flow. This often leads to swelling in your ankles and feet, and sometimes in your hands. Swelling in your hands may lead to a condition known as carpal tunnel syndrome, resulting in pain in your wrists. You can improve swelling with these techniques:   Avoid salty foods  Elevate your feet high than the level of your heart  Avoid standing for long periods  Wear loose clothes  Wear wrist braces, especially at night, for carpal tunnel  If you experience swelling with blood pressure >140/90 or with other signs of preeclampsia, go to the hospital     Tiredness  Fatigue during pregnancy can be normal. It's most pronounced at the start and end of pregnancy. Make sure to get adequate sleep and rest. While most of the time this is normal, it can be a sign of anemia. You are screened for this routinely during your pregnancy.     Travel  Travel during pregnancy is safe. However, you should always check with your doctor first before traveling. During pregnancy, you are at increased risk for blood clots, so you should walk around every 1-2 hours during travel. Always wear a seatbelt when riding in the car. Place the shoulder strap across your chest and place the waist belt underneath your belly. You should avoid flying after 35 weeks.     True Labor vs False Labor  It's important to e able to tell the difference between true and false labor. When labor begins, you have regular contractions every 5 minutes for 2 hours. The  contractions get stronger (average is about 1 minute) in intensity and last longer. The cervix starts to dilate or open. A bloody show or pink discharge may occur. A sudden gush or leaking of watery fluid from the vagina may be because your water bag has broken.   When you are having false labor, the cervix does not dilate. Contractions are not in a regular pattern. They do not get strong in intensity and changing your activity, such as walking or lying down, may make the pain of contractions lessen. Staying hydrated with plenty of water and or taking a dose of Tylenol makes contractions decrease.     Urinary Tract Infection   Urinary tract infections can cause  labor. Here are some methods to help prevent getting a urinary tract infection:   Drink 8-10 glasses of water a day   Drink cranberry juice every day as it lowers the pH or the urinary tract and discourages bacterial growth   Empty your bladder immediately before and after sexual intercourse  Wipe from front to back after using the toilet  Avoid irritating bubble baths and soaps.   Wear cotton underwear.   If you experience burning when you urinate, blood in your urine, fever, chills, or pain associated with urination, tell your doctor.     Vaginal Bleeding and Discharge  Hormonal changes during pregnancy can cause vaginal discharge or varying consistencies. You may have a thick white discharge from your vagina that helps prepare your body for birth. You may have vaginal bleeding for different reasons such as broken blood vessels in your cervix after a vaginal exam. A vaginal infection may cause bleeding. Also, having sex may cause some of the blood vessels to break and result in some spotting. If you have bleeding like a menstrual period, itching, irritation, or foul odor, call your doctor.     Varicose Veins  Varicose veins occur because of dilation of the blood vessels during pregnancy. Varicose veins may occur on the legs or even the vulva. Avoid  standing for long periods of time, elevate your feet at night, and wear support hose during the day.     Weight Gain  While you should not diet during your pregnancy, there is an expected amount of weight you should gain during your pregnancy based on your BMI:   BMI  < 18.5 -  28-40 lbs.  18.5-24.9 -  25-35 lbs.  25-29.9 -  15-25 lbs.  >30 -   11-20 lbs.    Yeast Infections   In pregnancy, we would recommend any of the following (all are over the counter) - so you will not need a prescription for these medications. These products can be used at any point during pregnancy and don't pose a risk of birth defects or other pregnancy complications. For best results, choose a seven-day formula.   Clotrimazole (Mycelex, Lotrimin AF)  Miconazole (Monistat)  Terconazole    WHAT TO EXPECT AT CERTAINS WEEKS FOR VISITS/ULTRASOUNDS:     6-10 WEEKS  Schedule a transvaginal ultrasound to be done at a later date   This will be scheduled as a separate visit by our M department. This is usually scheduled between 8-12 weeks to confirm your dating.   Discuss prenatal vitamins  Order labs such as CBC, Rh type, Syphilis, Hepatitis, HIV, Rubella titers   Gonorrhea and Chlamydia test   Pap smear if due   Get medical history and previous OB history   Monthly visits until 28 week visit     12 weeks   Discuss prenatal lab testing  Check weight, blood pressure, and urine  Listen for fetal heart tones  Sign up for patient portal and connected mom   MaternTI 21 (NIPT) testing - optional     15-18 weeks  Listen for fetal heart tones and check uterine size  Order quad screen/maternal integrated screening if wanted   Check weight, blood pressure, urine  Maternal Quad Screen for down syndrome screen   Anatomy scan at 18-20 weeks     22-24 weeks  Glucose testing     28-30 weeks    Check weight, blood pressure, and urine  Listen for fetal heart tones  Tdap vaccine   Rhogam vaccine if - Rh.   Prenatal visits every 2 weeks until 36 weeks   Nonstress  tests if necessary     32-36 weeks   Check weight, blood pressure, and urine  Listen for fetal heart tones  Group B strep culture of vagina     37-40 weeks   Check weight, blood pressure, and urine  Listen for fetal heart tones  Discuss labor and delivery   Cervical examination     NUTRITION IN PREGNANCY   What is folic acid and how much do I need daily?   Folic acid, also known as folate, is a B vitamin that is important for pregnant women. Before pregnancy and during pregnancy, you need 400 micrograms of folic acid daily to help prevent major birth defects of the fetal brain and spine called neural tube defects. Current dietary guidelines recommend that pregnant women get at least 600 micrograms of folic acid daily from all sources. It may be hard to get the recommended amount of folic acid from food alone. For this reason, all pregnant women and all women who may become pregnant should take a daily vitamin supplement that contains folic acid.    Why is iron important during pregnancy and how much do I need daily?  Iron is used by your body to make a substance in red blood cells that carries oxygen to your organs and tissues. During pregnancy, you need extra iron--about double the amount that a nonpregnant woman needs. This extra iron helps your body make more blood to supply oxygen to your fetus. The daily recommended dose of iron during pregnancy is 27 mg, which is found in most prenatal vitamin supplements. You also can eat iron-rich foods, including lean red meat, poultry, fish, dried beans and peas, iron-fortified cereals, and prune juice. Iron also can be absorbed more easily if iron-rich foods are eaten with vitamin C-rich foods, such as citrus fruits and tomatoes.    Why is calcium important during pregnancy and how much do I need daily?  Calcium is used to build your fetus's bones and teeth. All women, including pregnant women, aged 19 years and older should get 1,000 mg of calcium daily; those aged 14-18  years should get 1,300 mg daily. Milk and other dairy products, such as cheese and yogurt, are the best sources of calcium. If you have trouble digesting milk products, you can get calcium from other sources, such as broccoli; dark, leafy greens; sardines; or a calcium supplement.    Why is vitamin D important during pregnancy and how much do I need daily?  Vitamin D works with calcium to help the fetus's bones and teeth develop. It also is essential for healthy skin and eyesight. All women, including those who are pregnant, need 600 international units of vitamin D a day. Good sources are milk fortified with vitamin D and fatty fish such as salmon. Exposure to sunlight also converts a chemical in the skin to vitamin D.    Can being overweight or obese affect my pregnancy?  Overweight and obese women are at an increased risk of several pregnancy problems. These problems include gestational diabetes, high blood pressure, preeclampsia,  birth, and  delivery. Babies of overweight and obese women also are at greater risk of certain problems, such as birth defects, macrosomia with possible birth injury, and childhood obesity.    Can caffeine in my diet affect my pregnancy?  Although there have been many studies on whether caffeine increases the risk of miscarriage, the results are unclear. Most experts state that consuming fewer than 200 mg of caffeine (one 12-ounce cup of coffee) a day during pregnancy is safe.  Food and Beverages Milligrams of Caffeine (Average)   Coffee (8 oz)    Brewed, drip  137   Instant 76   Tea (8oz)     Brewed 48   Instant 26-36   Caffeinated soft drinks (12 oz)  37   Hot cocoa (12 oz) 8-12   Chocolate milk (8 oz)  5-8   Candy    Dark chocolate (1.45 oz) 30   Milk chocolate (1.55 oz)  11   Semi-sweet chocolate (1/4 cup)  26-28   Chocolate syrup (1 tbsp.) 3   Coffee ice cream or frozen yogurt (1/2 cup) 2     How much water should I drink during pregnancy?   During pregnancy,  adequate fluid intake from consumption of beverages (water and other liquids) is estimated to be approximately 2.3 L/day (76 fluid ounces or approximately 10 cups). Additional water is consumed in foods other than beverages to meet the total adequate intake of 3 L/day.    What are the benefits of including fish and shellfish in my diet during pregnancy?  Omega-3 fatty acids are a type of fat found naturally in many kinds of fish. They may be important factors in your fetus's brain development both before and after birth. To get the most benefits from omega-3 fatty acids, women should eat at least two servings of fish or shellfish (about 8-12 ounces) per week before getting pregnant, while pregnant, and while breastfeeding.    What should I know about eating fish during pregnancy?  Some types of fish have higher levels of a metal called mercury than others. Mercury has been linked to birth defects. To limit your exposure to mercury, follow a few simple guidelines. Choose fish and shellfish such as shrimp, salmon, catfish, and pollock. Do not eat shark, swordfish, logan mackerel, griggs, orange roughy, or tilefish. Limit white (albacore) tuna to 6 ounces a week. You also should check advisories about fish caught in local bill.    How can food poisoning affect my pregnancy?  Food poisoning in a pregnant woman can cause serious problems for both her and her fetus. Vomiting and diarrhea can cause your body to lose too much water and can disrupt your body's chemical balance. To prevent food poisoning, follow these general guidelines:    Wash food. Rinse all raw produce thoroughly under running tap water before eating, cutting, or cooking.  Keep your kitchen clean. Wash your hands, knives, countertops, and cutting boards after handling and preparing uncooked foods.  Avoid all raw and undercooked seafood, eggs, and meat. Do not eat sushi made with raw fish (cooked sushi is safe). Food such as beef, pork, or poultry should  be cooked to a safe internal temperature.    What is listeriosis and how can it affect my pregnancy?  Listeriosis is a type of food-borne illness caused by bacteria. Pregnant women are 13 times more likely to get listeriosis than the general population. Listeriosis can cause mild, flu-like symptoms such as fever, muscle aches, and diarrhea, but it also may not cause any symptoms. Listeriosis can lead to miscarriage, stillbirth, and premature delivery. Antibiotics can be given to treat the infection and to protect your fetus. To help prevent listeriosis, avoid eating the following foods during pregnancy:  Unpasteurized milk and foods made with unpasteurized milk  Hot dogs, luncheon meats, and cold cuts unless they are heated until steaming hot just before serving  Refrigerated conroy and meat spreads  Refrigerated smoked seafood  Raw and undercooked seafood, eggs, and meat

## 2023-06-16 LAB
C TRACH DNA SPEC QL NAA+PROBE: NOT DETECTED
N GONORRHOEA DNA SPEC QL NAA+PROBE: NOT DETECTED

## 2023-06-17 LAB — BACTERIA UR CULT: NORMAL

## 2023-06-21 ENCOUNTER — TELEPHONE (OUTPATIENT)
Dept: OTOLARYNGOLOGY | Facility: CLINIC | Age: 28
End: 2023-06-21
Payer: COMMERCIAL

## 2023-06-21 NOTE — TELEPHONE ENCOUNTER
Pt call was returned. Pt was informed a message was given to provider. And as soon as provider respond someone will give her a call.          ----- Message from Martín Santa sent at 6/21/2023  1:10 PM CDT -----  Regarding: Michelle  Type:Patient Callback     Who called: Michelle     What is the request in detail: Pt would like to be prescribed something else for the nausea because what she is taking now is not working. Please contact the patient as soon as possible.     Can the clinic reply by [facility]? Yes     Would the patient rather a call back or a response via [facility]?Callback     Best call back number: [phone]      Additional Information:

## 2023-06-22 ENCOUNTER — TELEPHONE (OUTPATIENT)
Dept: OBSTETRICS AND GYNECOLOGY | Facility: CLINIC | Age: 28
End: 2023-06-22
Payer: COMMERCIAL

## 2023-06-22 NOTE — TELEPHONE ENCOUNTER
Pt call was returned. Message was left to see if pt tried.       Vitamin B6 (also known as pyridoxine) and Unisom- available over-the-counter.  Take 1/2 tablet of the Unisom in the morning and a full tablet in the evening (to minimize sleepiness during the day).  Take 25 mg of the pyridoxine every 6-8 hours.           ----- Message from Gloria Lozano PA-C sent at 6/22/2023  9:26 AM CDT -----  Regarding: RE: Michelle  Can you check that she has tried     Vitamin B6 (also known as pyridoxine) and Unisom- available over-the-counter.  Take 1/2 tablet of the Unisom in the morning and a full tablet in the evening (to minimize sleepiness during the day).  Take 25 mg of the pyridoxine every 6-8 hours.     If this isn't working, let me know and we can try something else  ----- Message -----  From: Dorothy Smith MA  Sent: 6/21/2023   1:17 PM CDT  To: Gloria Lozano PA-C  Subject: FW: Michelle                                         What would you like me to tell this pt?  ----- Message -----  From: Martín Santa  Sent: 6/21/2023   1:12 PM CDT  To: Marta Nobles  Subject: Michelle                                             Type:Patient Callback     Who called: Michelle     What is the request in detail: Pt would like to be prescribed something else for the nausea because what she is taking now is not working. Please contact the patient as soon as possible.     Can the clinic reply by [facility]? Yes     Would the patient rather a call back or a response via [facility]?Callback     Best call back number: [phone]      Additional Information:

## 2023-06-23 ENCOUNTER — TELEPHONE (OUTPATIENT)
Dept: OBSTETRICS AND GYNECOLOGY | Facility: CLINIC | Age: 28
End: 2023-06-23
Payer: COMMERCIAL

## 2023-06-23 RX ORDER — METOCLOPRAMIDE 10 MG/1
10 TABLET ORAL EVERY 8 HOURS PRN
Qty: 90 TABLET | Refills: 1 | Status: SHIPPED | OUTPATIENT
Start: 2023-06-23 | End: 2023-12-28

## 2023-06-23 NOTE — TELEPHONE ENCOUNTER
Pt was called and notified that Gloria sent medication Reglan to her pharmacy.        ----- Message from Gloria Lozano PA-C sent at 6/23/2023  9:44 AM CDT -----  Regarding: RE: Return Call  Reglan sent to pharmacy  ----- Message -----  From: Dorothy Smith MA  Sent: 6/22/2023   4:47 PM CDT  To: Gloria Lozano PA-C  Subject: FW: Return Call                                  Yes she did try b6 did not work.  ----- Message -----  From: Berna Fernández  Sent: 6/22/2023   3:19 PM CDT  To: Marta SALAS Staff  Subject: Return Call                                      .Type:  Patient Returning Call    Who Called: Self     Who Left Message for Patient: DOROTHY    Does the patient know what this is regarding?: yes, she did try the B6    Would the patient rather a call back or a response via [facility]? Call     Best Call Back Number: .[phone]

## 2023-06-24 LAB
ABO + RH BLD: NORMAL
BLD GP AB SCN CELLS X3 SERPL QL: NORMAL
RH BLD: NORMAL
SPECIMEN OUTDATE: NORMAL

## 2023-06-28 ENCOUNTER — PATIENT MESSAGE (OUTPATIENT)
Dept: OBSTETRICS AND GYNECOLOGY | Facility: CLINIC | Age: 28
End: 2023-06-28
Payer: COMMERCIAL

## 2023-06-29 ENCOUNTER — PATIENT MESSAGE (OUTPATIENT)
Dept: OBSTETRICS AND GYNECOLOGY | Facility: CLINIC | Age: 28
End: 2023-06-29
Payer: COMMERCIAL

## 2023-06-30 RX ORDER — PROMETHAZINE HYDROCHLORIDE 12.5 MG/1
12.5 TABLET ORAL EVERY 6 HOURS PRN
Qty: 30 TABLET | Refills: 1 | Status: SHIPPED | OUTPATIENT
Start: 2023-06-30 | End: 2023-12-14

## 2023-07-03 ENCOUNTER — PATIENT MESSAGE (OUTPATIENT)
Dept: OBSTETRICS AND GYNECOLOGY | Facility: CLINIC | Age: 28
End: 2023-07-03
Payer: COMMERCIAL

## 2023-07-07 ENCOUNTER — TELEPHONE (OUTPATIENT)
Dept: OBSTETRICS AND GYNECOLOGY | Facility: CLINIC | Age: 28
End: 2023-07-07
Payer: COMMERCIAL

## 2023-07-10 ENCOUNTER — PATIENT MESSAGE (OUTPATIENT)
Dept: MATERNAL FETAL MEDICINE | Facility: CLINIC | Age: 28
End: 2023-07-10
Payer: COMMERCIAL

## 2023-07-11 ENCOUNTER — LAB VISIT (OUTPATIENT)
Dept: LAB | Facility: HOSPITAL | Age: 28
End: 2023-07-11
Attending: OBSTETRICS & GYNECOLOGY
Payer: COMMERCIAL

## 2023-07-11 ENCOUNTER — PATIENT MESSAGE (OUTPATIENT)
Dept: ADMINISTRATIVE | Facility: OTHER | Age: 28
End: 2023-07-11
Payer: COMMERCIAL

## 2023-07-11 ENCOUNTER — INITIAL PRENATAL (OUTPATIENT)
Dept: OBSTETRICS AND GYNECOLOGY | Facility: CLINIC | Age: 28
End: 2023-07-11
Payer: COMMERCIAL

## 2023-07-11 ENCOUNTER — PROCEDURE VISIT (OUTPATIENT)
Dept: MATERNAL FETAL MEDICINE | Facility: CLINIC | Age: 28
End: 2023-07-11
Payer: COMMERCIAL

## 2023-07-11 VITALS — WEIGHT: 167 LBS | DIASTOLIC BLOOD PRESSURE: 70 MMHG | SYSTOLIC BLOOD PRESSURE: 118 MMHG | BODY MASS INDEX: 26.16 KG/M2

## 2023-07-11 DIAGNOSIS — Z34.80 SUPERVISION OF OTHER NORMAL PREGNANCY, ANTEPARTUM: Primary | ICD-10-CM

## 2023-07-11 DIAGNOSIS — Z36.89 ENCOUNTER FOR FETAL ANATOMIC SURVEY: Primary | ICD-10-CM

## 2023-07-11 DIAGNOSIS — Z34.80 SUPERVISION OF OTHER NORMAL PREGNANCY, ANTEPARTUM: ICD-10-CM

## 2023-07-11 DIAGNOSIS — N91.4 SECONDARY OLIGOMENORRHEA: ICD-10-CM

## 2023-07-11 DIAGNOSIS — O21.9 NAUSEA AND VOMITING IN PREGNANCY: ICD-10-CM

## 2023-07-11 LAB
ABO GROUP BLD: NORMAL
BASOPHILS # BLD AUTO: 0.03 K/UL (ref 0–0.2)
BASOPHILS NFR BLD: 0.7 % (ref 0–1.9)
BLD GP AB SCN CELLS X3 SERPL QL: NORMAL
DIFFERENTIAL METHOD: NORMAL
EOSINOPHIL # BLD AUTO: 0.1 K/UL (ref 0–0.5)
EOSINOPHIL NFR BLD: 2.2 % (ref 0–8)
ERYTHROCYTE [DISTWIDTH] IN BLOOD BY AUTOMATED COUNT: 13.2 % (ref 11.5–14.5)
HAV IGM SERPL QL IA: NORMAL
HBV CORE IGM SERPL QL IA: NORMAL
HBV SURFACE AG SERPL QL IA: NORMAL
HCT VFR BLD AUTO: 40.1 % (ref 37–48.5)
HCV AB SERPL QL IA: NORMAL
HGB BLD-MCNC: 13.3 G/DL (ref 12–16)
HIV 1+2 AB+HIV1 P24 AG SERPL QL IA: NORMAL
IMM GRANULOCYTES # BLD AUTO: 0.02 K/UL (ref 0–0.04)
IMM GRANULOCYTES NFR BLD AUTO: 0.4 % (ref 0–0.5)
LYMPHOCYTES # BLD AUTO: 1.3 K/UL (ref 1–4.8)
LYMPHOCYTES NFR BLD: 27.8 % (ref 18–48)
MCH RBC QN AUTO: 29.8 PG (ref 27–31)
MCHC RBC AUTO-ENTMCNC: 33.2 G/DL (ref 32–36)
MCV RBC AUTO: 90 FL (ref 82–98)
MONOCYTES # BLD AUTO: 0.4 K/UL (ref 0.3–1)
MONOCYTES NFR BLD: 8 % (ref 4–15)
NEUTROPHILS # BLD AUTO: 2.7 K/UL (ref 1.8–7.7)
NEUTROPHILS NFR BLD: 60.9 % (ref 38–73)
NRBC BLD-RTO: 0 /100 WBC
PLATELET # BLD AUTO: 174 K/UL (ref 150–450)
PMV BLD AUTO: 11.2 FL (ref 9.2–12.9)
RBC # BLD AUTO: 4.46 M/UL (ref 4–5.4)
RH BLD: NORMAL
WBC # BLD AUTO: 4.5 K/UL (ref 3.9–12.7)

## 2023-07-11 PROCEDURE — 0500F INITIAL PRENATAL CARE VISIT: CPT | Mod: CPTII,S$GLB,, | Performed by: OBSTETRICS & GYNECOLOGY

## 2023-07-11 PROCEDURE — 81025 URINE PREGNANCY TEST: CPT | Mod: S$GLB,,, | Performed by: OBSTETRICS & GYNECOLOGY

## 2023-07-11 PROCEDURE — 84163 PAPPA SERUM: CPT | Performed by: OBSTETRICS & GYNECOLOGY

## 2023-07-11 PROCEDURE — 76801 US OB/GYN EXTENDED PROCEDURE (VIEWPOINT): ICD-10-PCS | Mod: S$GLB,,, | Performed by: OBSTETRICS & GYNECOLOGY

## 2023-07-11 PROCEDURE — 76801 OB US < 14 WKS SINGLE FETUS: CPT | Mod: S$GLB,,, | Performed by: OBSTETRICS & GYNECOLOGY

## 2023-07-11 PROCEDURE — 99999 PR PBB SHADOW E&M-EST. PATIENT-LVL III: ICD-10-PCS | Mod: PBBFAC,,, | Performed by: OBSTETRICS & GYNECOLOGY

## 2023-07-11 PROCEDURE — 80074 ACUTE HEPATITIS PANEL: CPT | Performed by: OBSTETRICS & GYNECOLOGY

## 2023-07-11 PROCEDURE — 86901 BLOOD TYPING SEROLOGIC RH(D): CPT | Performed by: OBSTETRICS & GYNECOLOGY

## 2023-07-11 PROCEDURE — 87389 HIV-1 AG W/HIV-1&-2 AB AG IA: CPT | Performed by: OBSTETRICS & GYNECOLOGY

## 2023-07-11 PROCEDURE — 83020 HEMOGLOBIN ELECTROPHORESIS: CPT | Performed by: OBSTETRICS & GYNECOLOGY

## 2023-07-11 PROCEDURE — 99499 UNLISTED E&M SERVICE: CPT | Mod: S$GLB,,, | Performed by: OBSTETRICS & GYNECOLOGY

## 2023-07-11 PROCEDURE — 99999 PR PBB SHADOW E&M-EST. PATIENT-LVL III: CPT | Mod: PBBFAC,,, | Performed by: OBSTETRICS & GYNECOLOGY

## 2023-07-11 PROCEDURE — 86592 SYPHILIS TEST NON-TREP QUAL: CPT | Performed by: OBSTETRICS & GYNECOLOGY

## 2023-07-11 PROCEDURE — 0500F PR INITIAL PRENATAL CARE VISIT: ICD-10-PCS | Mod: CPTII,S$GLB,, | Performed by: OBSTETRICS & GYNECOLOGY

## 2023-07-11 PROCEDURE — 86762 RUBELLA ANTIBODY: CPT | Performed by: OBSTETRICS & GYNECOLOGY

## 2023-07-11 PROCEDURE — 86900 BLOOD TYPING SEROLOGIC ABO: CPT | Performed by: OBSTETRICS & GYNECOLOGY

## 2023-07-11 PROCEDURE — 99499 NO LOS: ICD-10-PCS | Mod: S$GLB,,, | Performed by: OBSTETRICS & GYNECOLOGY

## 2023-07-11 PROCEDURE — 81025 PR  URINE PREGNANCY TEST: ICD-10-PCS | Mod: S$GLB,,, | Performed by: OBSTETRICS & GYNECOLOGY

## 2023-07-11 PROCEDURE — 85025 COMPLETE CBC W/AUTO DIFF WBC: CPT | Performed by: OBSTETRICS & GYNECOLOGY

## 2023-07-11 PROCEDURE — 86850 RBC ANTIBODY SCREEN: CPT | Performed by: OBSTETRICS & GYNECOLOGY

## 2023-07-11 RX ORDER — ONDANSETRON 8 MG/1
8 TABLET, ORALLY DISINTEGRATING ORAL 2 TIMES DAILY
Qty: 60 TABLET | Refills: 0 | Status: SHIPPED | OUTPATIENT
Start: 2023-07-11 | End: 2023-08-16

## 2023-07-11 NOTE — PROGRESS NOTES
Complaints today: Ms. Burns reports that she continues to have nausea and vomiting at least 2-3x a day despite zofran, phenergan or reglan. She reports that zofran worked well in her last pregnancy. She denies vaginal bleeding, LOF or contractions at this time.     /70   Wt 75.8 kg (167 lb)   LMP 2023 (Exact Date)   BMI 26.16 kg/m²     28 y.o., at 10w4d by Estimated Date of Delivery: 24  Patient Active Problem List   Diagnosis    Acute nasopharyngitis     OB History    Para Term  AB Living   4 1 1   2 1   SAB IAB Ectopic Multiple Live Births                  # Outcome Date GA Lbr Car/2nd Weight Sex Delivery Anes PTL Lv   4 Current            3 Term 2018     CS-LTranv      2 AB            1 AB                Dating reviewed    Allergies and problem list reviewed and updated    Medical and surgical history reviewed    Prenatal labs reviewed and updated    Physical Exam:  ABD: soft, gravid, nontender    Assessment:  Michelle was seen today for initial prenatal visit.    Diagnoses and all orders for this visit:    Supervision of other normal pregnancy, antepartum  -     Connected MOM Enrollment  -     Assign Connected MOM Program Consent Questionnaire  -     Hemoglobin Electrophoresis,Hgb A2 Lefty.; Future  -     RPR; Future  -     HIV 1/2 Ag/Ab (4th Gen); Future  -     Type & Screen - Ob Profile; Future  -     Rubella Antibody, IgG; Future  -     CBC Auto Differential; Future  -     Hepatitis Panel, Acute; Future  -     Type & Screen; Future  -     Maternal Integrated Screen Part 1; Future    Nausea and vomiting in pregnancy  -     ondansetron (ZOFRAN-ODT) 8 MG TbDL; Take 1 tablet (8 mg total) by mouth 2 (two) times daily.  - Vitamin B6 25mg every 4 -6 hours        Orders Placed This Encounter   Procedures    Hemoglobin Electrophoresis,Hgb A2 Lefty.    RPR    HIV 1/2 Ag/Ab (4th Gen)    Rubella Antibody, IgG    CBC Auto Differential    Hepatitis Panel, Acute    Maternal Integrated  Screen Part 1    Connected MOM Enrollment    Type & Screen - Ob Profile    Type & Screen       RTC in 4 weeks

## 2023-07-12 LAB
HGB A2 MFR BLD HPLC: 2.8 % (ref 2.2–3.2)
HGB FRACT BLD ELPH-IMP: NORMAL
HGB FRACT BLD ELPH-IMP: NORMAL
RUBV IGG SER-ACNC: 40.7 IU/ML
RUBV IGG SER-IMP: REACTIVE

## 2023-07-13 LAB — RPR SER QL: NORMAL

## 2023-07-13 NOTE — PROGRESS NOTES
Dating US   Assigned: based on ultrasound (CR[facility]), selected on 07/11/2023   Assigned GA 10 w + 4 d   Assigned EMILI: 2/2/2024

## 2023-07-14 LAB — INTEGRATED SCN PATIENT-IMP NAR: NORMAL

## 2023-08-02 ENCOUNTER — PATIENT MESSAGE (OUTPATIENT)
Dept: MATERNAL FETAL MEDICINE | Facility: CLINIC | Age: 28
End: 2023-08-02
Payer: COMMERCIAL

## 2023-08-10 ENCOUNTER — ROUTINE PRENATAL (OUTPATIENT)
Dept: OBSTETRICS AND GYNECOLOGY | Facility: CLINIC | Age: 28
End: 2023-08-10
Payer: COMMERCIAL

## 2023-08-10 VITALS — SYSTOLIC BLOOD PRESSURE: 118 MMHG | BODY MASS INDEX: 26.71 KG/M2 | WEIGHT: 170.5 LBS | DIASTOLIC BLOOD PRESSURE: 68 MMHG

## 2023-08-10 DIAGNOSIS — Z98.891 HISTORY OF CESAREAN SECTION: ICD-10-CM

## 2023-08-10 DIAGNOSIS — Z34.80 SUPERVISION OF OTHER NORMAL PREGNANCY, ANTEPARTUM: Primary | ICD-10-CM

## 2023-08-10 PROCEDURE — 0502F SUBSEQUENT PRENATAL CARE: CPT | Mod: CPTII,S$GLB,, | Performed by: OBSTETRICS & GYNECOLOGY

## 2023-08-10 PROCEDURE — 99999 PR PBB SHADOW E&M-EST. PATIENT-LVL III: ICD-10-PCS | Mod: PBBFAC,,, | Performed by: OBSTETRICS & GYNECOLOGY

## 2023-08-10 PROCEDURE — 0502F PR SUBSEQUENT PRENATAL CARE: ICD-10-PCS | Mod: CPTII,S$GLB,, | Performed by: OBSTETRICS & GYNECOLOGY

## 2023-08-10 PROCEDURE — 99999 PR PBB SHADOW E&M-EST. PATIENT-LVL III: CPT | Mod: PBBFAC,,, | Performed by: OBSTETRICS & GYNECOLOGY

## 2023-08-10 NOTE — PROGRESS NOTES
Complaints today:Ms. Burns reports that her nausea has improved and she is feeling much better.She denies vaginal bleeding, LOF or contractions at this time.     /68   Wt 77.3 kg (170 lb 8.4 oz)   LMP 2023 (Exact Date)   BMI 26.71 kg/m²     28 y.o., at 14w6d by Estimated Date of Delivery: 24  Patient Active Problem List   Diagnosis    Acute nasopharyngitis    Supervision of other normal pregnancy, antepartum    History of  section     OB History    Para Term  AB Living   4 1 1   2 1   SAB IAB Ectopic Multiple Live Births                  # Outcome Date GA Lbr Car/2nd Weight Sex Delivery Anes PTL Lv   4 Current            3 Term 2018     CS-LTranv      2 AB            1 AB                Dating reviewed    Allergies and problem list reviewed and updated    Medical and surgical history reviewed    Prenatal labs reviewed and updated    Physical Exam:  ABD: soft, gravid, nontender    Assessment:  Michelle was seen today for routine prenatal visit.    Diagnoses and all orders for this visit:    Supervision of other normal pregnancy, antepartum  - Reviewed MFM US and labs  - Nausea improved    History of  section        No orders of the defined types were placed in this encounter.      Follow up in about 4 weeks (around 2023) for Routine OB.

## 2023-08-16 DIAGNOSIS — O21.9 NAUSEA AND VOMITING IN PREGNANCY: ICD-10-CM

## 2023-08-16 RX ORDER — ONDANSETRON 8 MG/1
TABLET, ORALLY DISINTEGRATING ORAL
Qty: 60 TABLET | Refills: 0 | Status: SHIPPED | OUTPATIENT
Start: 2023-08-16 | End: 2023-12-28

## 2023-08-17 ENCOUNTER — PATIENT MESSAGE (OUTPATIENT)
Dept: OBSTETRICS AND GYNECOLOGY | Facility: CLINIC | Age: 28
End: 2023-08-17
Payer: COMMERCIAL

## 2023-08-18 ENCOUNTER — PATIENT MESSAGE (OUTPATIENT)
Dept: OTHER | Facility: OTHER | Age: 28
End: 2023-08-18
Payer: COMMERCIAL

## 2023-08-25 ENCOUNTER — PATIENT MESSAGE (OUTPATIENT)
Dept: OTHER | Facility: OTHER | Age: 28
End: 2023-08-25
Payer: COMMERCIAL

## 2023-09-07 ENCOUNTER — ROUTINE PRENATAL (OUTPATIENT)
Dept: OBSTETRICS AND GYNECOLOGY | Facility: CLINIC | Age: 28
End: 2023-09-07
Payer: COMMERCIAL

## 2023-09-07 ENCOUNTER — PROCEDURE VISIT (OUTPATIENT)
Dept: MATERNAL FETAL MEDICINE | Facility: CLINIC | Age: 28
End: 2023-09-07
Payer: COMMERCIAL

## 2023-09-07 VITALS
BODY MASS INDEX: 27.52 KG/M2 | WEIGHT: 175.69 LBS | DIASTOLIC BLOOD PRESSURE: 70 MMHG | SYSTOLIC BLOOD PRESSURE: 118 MMHG

## 2023-09-07 DIAGNOSIS — Z36.89 ENCOUNTER FOR FETAL ANATOMIC SURVEY: ICD-10-CM

## 2023-09-07 DIAGNOSIS — Z34.80 SUPERVISION OF OTHER NORMAL PREGNANCY, ANTEPARTUM: Primary | ICD-10-CM

## 2023-09-07 PROCEDURE — 76805 US MFM PROCEDURE (VIEWPOINT): ICD-10-PCS | Mod: S$GLB,,, | Performed by: OBSTETRICS & GYNECOLOGY

## 2023-09-07 PROCEDURE — 0502F PR SUBSEQUENT PRENATAL CARE: ICD-10-PCS | Mod: CPTII,S$GLB,, | Performed by: OBSTETRICS & GYNECOLOGY

## 2023-09-07 PROCEDURE — 99999 PR PBB SHADOW E&M-EST. PATIENT-LVL III: ICD-10-PCS | Mod: PBBFAC,,, | Performed by: OBSTETRICS & GYNECOLOGY

## 2023-09-07 PROCEDURE — 99999 PR PBB SHADOW E&M-EST. PATIENT-LVL III: CPT | Mod: PBBFAC,,, | Performed by: OBSTETRICS & GYNECOLOGY

## 2023-09-07 PROCEDURE — 0502F SUBSEQUENT PRENATAL CARE: CPT | Mod: CPTII,S$GLB,, | Performed by: OBSTETRICS & GYNECOLOGY

## 2023-09-07 PROCEDURE — 76805 OB US >/= 14 WKS SNGL FETUS: CPT | Mod: S$GLB,,, | Performed by: OBSTETRICS & GYNECOLOGY

## 2023-09-15 ENCOUNTER — PATIENT MESSAGE (OUTPATIENT)
Dept: OTHER | Facility: OTHER | Age: 28
End: 2023-09-15
Payer: COMMERCIAL

## 2023-09-30 NOTE — PROGRESS NOTES
Complaints today:Ms. Burns reports that she is doing well with no complaints. Normal fetal movements with no vaginal bleeding, LOF or contractions at this time.       /70   Wt 79.7 kg (175 lb 11.3 oz)   LMP 2023 (Exact Date)   BMI 27.52 kg/m²     28 y.o., at 22w1d by Estimated Date of Delivery: 24  Patient Active Problem List   Diagnosis    Acute nasopharyngitis    Supervision of other normal pregnancy, antepartum    History of  section     OB History    Para Term  AB Living   4 1 1   2 1   SAB IAB Ectopic Multiple Live Births                  # Outcome Date GA Lbr Car/2nd Weight Sex Delivery Anes PTL Lv   4 Current            3 Term 2018     CS-LTranv      2 AB            1 AB                Dating reviewed    Allergies and problem list reviewed and updated    Medical and surgical history reviewed    Prenatal labs reviewed and updated    Physical Exam:  ABD: soft, gravid, nontender, 19cm    Assessment:  Michelle was seen today for routine prenatal visit.    Diagnoses and all orders for this visit:    Supervision of other normal pregnancy, antepartum  -     Maternal Screen AFP (Single Marker); Future  - Doing well        Orders Placed This Encounter   Procedures    Maternal Screen AFP (Single Marker)       Follow up in about 4 weeks (around 10/5/2023) for Routine OB.

## 2023-10-05 ENCOUNTER — ROUTINE PRENATAL (OUTPATIENT)
Dept: OBSTETRICS AND GYNECOLOGY | Facility: CLINIC | Age: 28
End: 2023-10-05
Payer: COMMERCIAL

## 2023-10-05 VITALS — WEIGHT: 180 LBS | SYSTOLIC BLOOD PRESSURE: 120 MMHG | BODY MASS INDEX: 28.19 KG/M2 | DIASTOLIC BLOOD PRESSURE: 60 MMHG

## 2023-10-05 DIAGNOSIS — Z34.80 SUPERVISION OF OTHER NORMAL PREGNANCY, ANTEPARTUM: Primary | ICD-10-CM

## 2023-10-05 DIAGNOSIS — Z98.891 HISTORY OF CESAREAN SECTION: ICD-10-CM

## 2023-10-05 PROCEDURE — 0502F SUBSEQUENT PRENATAL CARE: CPT | Mod: CPTII,S$GLB,, | Performed by: OBSTETRICS & GYNECOLOGY

## 2023-10-05 PROCEDURE — 99999 PR PBB SHADOW E&M-EST. PATIENT-LVL III: ICD-10-PCS | Mod: PBBFAC,,, | Performed by: OBSTETRICS & GYNECOLOGY

## 2023-10-05 PROCEDURE — 0502F PR SUBSEQUENT PRENATAL CARE: ICD-10-PCS | Mod: CPTII,S$GLB,, | Performed by: OBSTETRICS & GYNECOLOGY

## 2023-10-05 PROCEDURE — 99999 PR PBB SHADOW E&M-EST. PATIENT-LVL III: CPT | Mod: PBBFAC,,, | Performed by: OBSTETRICS & GYNECOLOGY

## 2023-10-05 NOTE — PROGRESS NOTES
Complaints today: Ms. Burns reports that her son started nursery so he has been bringing home a lot of bugs which she is christopher. She currently has stuffy nose and cough. She also has been experiencing pelvic pressure. She will be traveling for Thanksgiving to [place]. Normal fetal movements with no vaginal bleeding, LOF or contractions at this time.     /60   Wt 81.7 kg (180 lb 0.1 oz)   LMP 2023 (Exact Date)   BMI 28.19 kg/m²     28 y.o., at 22w6d by Estimated Date of Delivery: 24  Patient Active Problem List   Diagnosis    Acute nasopharyngitis    Supervision of other normal pregnancy, antepartum    History of  section     OB History    Para Term  AB Living   4 1 1   2 1   SAB IAB Ectopic Multiple Live Births                  # Outcome Date GA Lbr Car/2nd Weight Sex Delivery Anes PTL Lv   4 Current            3 Term 2018     CS-LTranv      2 AB            1 AB                Dating reviewed    Allergies and problem list reviewed and updated    Medical and surgical history reviewed    Prenatal labs reviewed and updated    Physical Exam:  ABD: soft, gravid, nontender, 23cm    Assessment:  Michelle was seen today for routine prenatal visit.    Diagnoses and all orders for this visit:    Supervision of other normal pregnancy, antepartum  -     CBC Auto Differential; Future  -     OB Glucose Screen; Future  - Discussed medication that can be taken for a cold  - Maternity belt for pelvic pressure    History of  section  - Need Records Release for op note      Orders Placed This Encounter   Procedures    CBC Auto Differential    OB Glucose Screen       Follow up in about 4 weeks (around 2023) for Routine OB.

## 2023-10-13 ENCOUNTER — TELEPHONE (OUTPATIENT)
Dept: OBSTETRICS AND GYNECOLOGY | Facility: CLINIC | Age: 28
End: 2023-10-13
Payer: COMMERCIAL

## 2023-10-13 ENCOUNTER — PATIENT MESSAGE (OUTPATIENT)
Dept: OTHER | Facility: OTHER | Age: 28
End: 2023-10-13
Payer: COMMERCIAL

## 2023-10-13 ENCOUNTER — LAB VISIT (OUTPATIENT)
Dept: LAB | Facility: HOSPITAL | Age: 28
End: 2023-10-13
Attending: OBSTETRICS & GYNECOLOGY
Payer: COMMERCIAL

## 2023-10-13 DIAGNOSIS — Z34.80 SUPERVISION OF OTHER NORMAL PREGNANCY, ANTEPARTUM: ICD-10-CM

## 2023-10-13 LAB
BASOPHILS # BLD AUTO: 0.02 K/UL (ref 0–0.2)
BASOPHILS NFR BLD: 0.3 % (ref 0–1.9)
DIFFERENTIAL METHOD: ABNORMAL
EOSINOPHIL # BLD AUTO: 0.1 K/UL (ref 0–0.5)
EOSINOPHIL NFR BLD: 1.3 % (ref 0–8)
ERYTHROCYTE [DISTWIDTH] IN BLOOD BY AUTOMATED COUNT: 12.1 % (ref 11.5–14.5)
GLUCOSE SERPL-MCNC: 104 MG/DL (ref 70–140)
HCT VFR BLD AUTO: 31.1 % (ref 37–48.5)
HGB BLD-MCNC: 10.1 G/DL (ref 12–16)
IMM GRANULOCYTES # BLD AUTO: 0.04 K/UL (ref 0–0.04)
IMM GRANULOCYTES NFR BLD AUTO: 0.5 % (ref 0–0.5)
LYMPHOCYTES # BLD AUTO: 1.4 K/UL (ref 1–4.8)
LYMPHOCYTES NFR BLD: 17.7 % (ref 18–48)
MCH RBC QN AUTO: 30.1 PG (ref 27–31)
MCHC RBC AUTO-ENTMCNC: 32.5 G/DL (ref 32–36)
MCV RBC AUTO: 93 FL (ref 82–98)
MONOCYTES # BLD AUTO: 0.6 K/UL (ref 0.3–1)
MONOCYTES NFR BLD: 8 % (ref 4–15)
NEUTROPHILS # BLD AUTO: 5.5 K/UL (ref 1.8–7.7)
NEUTROPHILS NFR BLD: 72.2 % (ref 38–73)
NRBC BLD-RTO: 0 /100 WBC
PLATELET # BLD AUTO: 143 K/UL (ref 150–450)
PMV BLD AUTO: 12.4 FL (ref 9.2–12.9)
RBC # BLD AUTO: 3.35 M/UL (ref 4–5.4)
WBC # BLD AUTO: 7.62 K/UL (ref 3.9–12.7)

## 2023-10-13 PROCEDURE — 82950 GLUCOSE TEST: CPT | Performed by: OBSTETRICS & GYNECOLOGY

## 2023-10-13 PROCEDURE — 85025 COMPLETE CBC W/AUTO DIFF WBC: CPT | Performed by: OBSTETRICS & GYNECOLOGY

## 2023-10-13 PROCEDURE — 36415 COLL VENOUS BLD VENIPUNCTURE: CPT | Performed by: OBSTETRICS & GYNECOLOGY

## 2023-10-13 NOTE — TELEPHONE ENCOUNTER
Called and spoke with pt regarding lab work. [email or web address]    ----- Message from James Akil sent at 10/13/2023  1:12 PM CDT -----  Regarding: Self [phone]  Type: Patient Call Back    Who called: Self     What is the request in detail: call in regards to get information on the pt's glucose test. Would like to be called asap.     Can the clinic reply by [facility]? No     Would the patient rather a call back or a response via [facility]? Call back     Best call back number: [phone]     Additional Information:    Thank you.

## 2023-10-27 ENCOUNTER — PATIENT MESSAGE (OUTPATIENT)
Dept: OTHER | Facility: OTHER | Age: 28
End: 2023-10-27
Payer: COMMERCIAL

## 2023-11-01 ENCOUNTER — NURSE TRIAGE (OUTPATIENT)
Dept: ADMINISTRATIVE | Facility: CLINIC | Age: 28
End: 2023-11-01
Payer: COMMERCIAL

## 2023-11-01 ENCOUNTER — HOSPITAL ENCOUNTER (OUTPATIENT)
Facility: HOSPITAL | Age: 28
Discharge: HOME | End: 2023-11-01
Attending: OBSTETRICS & GYNECOLOGY | Admitting: OBSTETRICS & GYNECOLOGY
Payer: COMMERCIAL

## 2023-11-01 VITALS
RESPIRATION RATE: 18 BRPM | HEART RATE: 93 BPM | SYSTOLIC BLOOD PRESSURE: 117 MMHG | OXYGEN SATURATION: 100 % | TEMPERATURE: 98 F | DIASTOLIC BLOOD PRESSURE: 64 MMHG

## 2023-11-01 DIAGNOSIS — Z34.90 PREGNANCY: ICD-10-CM

## 2023-11-01 PROCEDURE — 59025 FETAL NON-STRESS TEST: CPT

## 2023-11-01 PROCEDURE — 99211 OFF/OP EST MAY X REQ PHY/QHP: CPT | Mod: 25

## 2023-11-01 NOTE — TELEPHONE ENCOUNTER
Pt 26 weeks pregnant. C/o pain in back that radiates to lower abdomen 5-6/10 since last night. Pt states that she took Tylenol without any improvement. Advised to go to LD per protocol. VU. Encounter routed to provider.     Reason for Disposition   MODERATE-SEVERE abdominal pain (e.g., interferes with normal activities, awakens from sleep)    Additional Information   Negative: Passed out (i.e., fainted, collapsed and was not responding)   Negative: Shock suspected (e.g., cold/pale/clammy skin, too weak to stand, low BP, rapid pulse)   Negative: Difficult to awaken or acting confused (e.g., disoriented, slurred speech)   Negative: SEVERE abdominal pain (e.g., excruciating), constant, and present > 1 hour   Negative: SEVERE vaginal bleeding (e.g., continuous red blood from vagina, large blood clots)   Negative: Sounds like a life-threatening emergency to the triager   Negative: Vomiting red blood or black (coffee ground) material   Negative: SEVERE headache that is not relieved with acetaminophen (e.g., Tylenol)    Protocols used: Pregnancy - Abdominal Pain Greater Than 20 Weeks EGA-A-OH

## 2023-11-01 NOTE — DISCHARGE INSTRUCTIONS
Home Undelivered Discharge Instructions    After Discharge Orders:    Future Appointments   Date Time Provider Department Center   11/2/2023  3:00 PM Aidee Scanlon MD [facility]   11/30/2023  2:45 PM INJECTION, [facility]   11/30/2023  3:00 PM Aidee Scanlon MD [facility]   12/14/2023  3:00 PM Aidee Scanlon MD [facility]   12/28/2023  3:00 PM Aidee Scanlon MD [facility]   1/11/2024  3:00 PM Aidee Scanlon MD [facility]   1/18/2024  3:00 PM Aidee Scanlon MD [facility]   1/25/2024  3:00 PM Aidee Scanlon MD [facility]   2/1/2024  3:00 PM Aidee Scanlon MD [facility]         Current Discharge Medication List        CONTINUE these medications which have NOT CHANGED    Details   metoclopramide HCl (REGLAN) 10 MG tablet Take 1 tablet (10 mg total) by mouth every 8 (eight) hours as needed (nausea).  Qty: 90 tablet, Refills: 1    Associated Diagnoses: Secondary oligomenorrhea      ondansetron (ZOFRAN) 4 MG tablet Take 1 tablet (4 mg total) by mouth daily as needed for Nausea.  Qty: 30 tablet, Refills: 1      ondansetron (ZOFRAN-ODT) 8 MG TbDL DISSOLVE 1 TABLET(8 MG) ON THE TONGUE TWICE DAILY  Qty: 60 tablet, Refills: 0    Associated Diagnoses: Nausea and vomiting in pregnancy      PNV,calcium 72-iron-folic acid (PRENATAL VITAMIN PLUS LOW IRON) 27 mg iron- 1 mg Tab Take one tablet daily.  Prescribe prenatal covered by insurance  Qty: 90 tablet, Refills: 11      promethazine (PHENERGAN) 12.5 MG Tab Take 1 tablet (12.5 mg total) by mouth every 6 (six) hours as needed (nausea).  Qty: 30 tablet, Refills: 1                     Diet:  normal diet as tolerated    Rest: normal activity as tolerated      Call physician or midwife, return to Labor and Delivery, call 911, or go to the nearest Emergency Room if: increased leakage or  fluid, contractions more than  4 per  1 hour, decreased fetal movement, persistent low back pain or cramping, bleeding from vaginal area, difficulty urinating, pain with urination, difficulty breathing, new calf pain, persistent headache, or vision change

## 2023-11-01 NOTE — NURSING
Notified Dr. Scanlon of patient complaints. Notified of FHT reactive, no ctx noted on toco. Orders to discharge home.

## 2023-11-01 NOTE — NURSING
"Pt arrived to unit with c/o of back spasms and abdominal cramping since 0200 today. Pt states that she "fell down the stairs last week and hit her back". Full assessment done. History and medications reviewed. Pt reports good fetal movement. Denies vaginal bleeding/discharge, HA, blurry vision, spots before the eyes, swelling of face/hands/feet, RUQ pain, dysuria, any changes in urination. Abdomen soft non-tender.         "

## 2023-11-01 NOTE — NURSING
Orders to discharge home. Discharge instructions given. Pt verbalized understanding with good recall noted. Questions answered. Pt ambulated off unit. NAD noted.

## 2023-11-02 ENCOUNTER — ROUTINE PRENATAL (OUTPATIENT)
Dept: OBSTETRICS AND GYNECOLOGY | Facility: CLINIC | Age: 28
End: 2023-11-02
Payer: COMMERCIAL

## 2023-11-02 VITALS
WEIGHT: 179.69 LBS | DIASTOLIC BLOOD PRESSURE: 60 MMHG | BODY MASS INDEX: 28.14 KG/M2 | SYSTOLIC BLOOD PRESSURE: 122 MMHG

## 2023-11-02 DIAGNOSIS — O21.9 NAUSEA AND VOMITING IN PREGNANCY: ICD-10-CM

## 2023-11-02 DIAGNOSIS — Z34.80 SUPERVISION OF OTHER NORMAL PREGNANCY, ANTEPARTUM: Primary | ICD-10-CM

## 2023-11-02 PROCEDURE — 99999 PR PBB SHADOW E&M-EST. PATIENT-LVL II: CPT | Mod: PBBFAC,,, | Performed by: OBSTETRICS & GYNECOLOGY

## 2023-11-02 PROCEDURE — 0502F SUBSEQUENT PRENATAL CARE: CPT | Mod: CPTII,S$GLB,, | Performed by: OBSTETRICS & GYNECOLOGY

## 2023-11-02 PROCEDURE — 0502F PR SUBSEQUENT PRENATAL CARE: ICD-10-PCS | Mod: CPTII,S$GLB,, | Performed by: OBSTETRICS & GYNECOLOGY

## 2023-11-02 PROCEDURE — 99999 PR PBB SHADOW E&M-EST. PATIENT-LVL II: ICD-10-PCS | Mod: PBBFAC,,, | Performed by: OBSTETRICS & GYNECOLOGY

## 2023-11-02 RX ORDER — ONDANSETRON 4 MG/1
8 TABLET, ORALLY DISINTEGRATING ORAL EVERY 8 HOURS
Qty: 60 TABLET | Refills: 3 | Status: ON HOLD | OUTPATIENT
Start: 2023-11-02 | End: 2024-01-26

## 2023-11-02 NOTE — PROGRESS NOTES
Complaints today: Ms. Burns reports that she continues to have back pain. She slipped and hit her left lower back on her stairs a few days ago. She also complains of pelvic cramping. She is drinking a lot of water but admits to not eating well due to nausea/vomiting. 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 Normal fetal movements with no vaginal bleeding, LOF or contractions at this time.     /60   Wt 81.5 kg (179 lb 10.8 oz)   LMP 2023 (Exact Date)   BMI 28.14 kg/m²     28 y.o., at 26w6d by Estimated Date of Delivery: 24  Patient Active Problem List   Diagnosis    Acute nasopharyngitis    Supervision of other normal pregnancy, antepartum    History of  section     OB History    Para Term  AB Living   4 1 1   2 1   SAB IAB Ectopic Multiple Live Births                  # Outcome Date GA Lbr Car/2nd Weight Sex Delivery Anes PTL Lv   4 Current            3 Term 2018     CS-LTranv      2 AB            1 AB                Dating reviewed    Allergies and problem list reviewed and updated    Medical and surgical history reviewed    Prenatal labs reviewed and updated    Physical Exam:  ABD: soft, gravid, nontender, 27cm    Assessment:  Michelle was seen today for routine prenatal visit.    Diagnoses and all orders for this visit:    Supervision of other normal pregnancy, antepartum  - Encouraged patient to not lift boxes and shelves at work  - Maternity belt  - Reviewed notes s/p 1LTCS which confirms that it was done for fetal intolerance in labor    Nausea and vomiting in pregnancy  -     ondansetron (ZOFRAN-ODT) 4 MG TbDL; Take 2 tablets (8 mg total) by mouth every 8 (eight) hours.  - Encourage eating 3x a day        No orders of the defined types were placed in this encounter.      RTC in [address] [place] [address]

## 2023-11-06 ENCOUNTER — PATIENT MESSAGE (OUTPATIENT)
Dept: OBSTETRICS AND GYNECOLOGY | Facility: CLINIC | Age: 28
End: 2023-11-06
Payer: COMMERCIAL

## 2023-11-30 ENCOUNTER — ROUTINE PRENATAL (OUTPATIENT)
Dept: OBSTETRICS AND GYNECOLOGY | Facility: CLINIC | Age: 28
End: 2023-11-30
Payer: COMMERCIAL

## 2023-11-30 VITALS
BODY MASS INDEX: 29.32 KG/M2 | DIASTOLIC BLOOD PRESSURE: 60 MMHG | SYSTOLIC BLOOD PRESSURE: 124 MMHG | WEIGHT: 187.19 LBS

## 2023-11-30 DIAGNOSIS — Z34.80 SUPERVISION OF OTHER NORMAL PREGNANCY, ANTEPARTUM: Primary | ICD-10-CM

## 2023-11-30 PROCEDURE — 0502F SUBSEQUENT PRENATAL CARE: CPT | Mod: CPTII,S$GLB,, | Performed by: OBSTETRICS & GYNECOLOGY

## 2023-11-30 PROCEDURE — 0502F PR SUBSEQUENT PRENATAL CARE: ICD-10-PCS | Mod: CPTII,S$GLB,, | Performed by: OBSTETRICS & GYNECOLOGY

## 2023-11-30 PROCEDURE — 99999 PR PBB SHADOW E&M-EST. PATIENT-LVL III: ICD-10-PCS | Mod: PBBFAC,,, | Performed by: OBSTETRICS & GYNECOLOGY

## 2023-11-30 PROCEDURE — 99999 PR PBB SHADOW E&M-EST. PATIENT-LVL III: CPT | Mod: PBBFAC,,, | Performed by: OBSTETRICS & GYNECOLOGY

## 2023-12-08 ENCOUNTER — PATIENT MESSAGE (OUTPATIENT)
Dept: OTHER | Facility: OTHER | Age: 28
End: 2023-12-08
Payer: COMMERCIAL

## 2023-12-14 ENCOUNTER — ROUTINE PRENATAL (OUTPATIENT)
Dept: OBSTETRICS AND GYNECOLOGY | Facility: CLINIC | Age: 28
End: 2023-12-14
Payer: COMMERCIAL

## 2023-12-14 VITALS
SYSTOLIC BLOOD PRESSURE: 110 MMHG | WEIGHT: 186.19 LBS | BODY MASS INDEX: 29.16 KG/M2 | DIASTOLIC BLOOD PRESSURE: 60 MMHG

## 2023-12-14 DIAGNOSIS — Z34.80 SUPERVISION OF OTHER NORMAL PREGNANCY, ANTEPARTUM: Primary | ICD-10-CM

## 2023-12-14 PROCEDURE — 99999 PR PBB SHADOW E&M-EST. PATIENT-LVL III: ICD-10-PCS | Mod: PBBFAC,,, | Performed by: OBSTETRICS & GYNECOLOGY

## 2023-12-14 PROCEDURE — 99999 PR PBB SHADOW E&M-EST. PATIENT-LVL III: CPT | Mod: PBBFAC,,, | Performed by: OBSTETRICS & GYNECOLOGY

## 2023-12-14 PROCEDURE — 0502F PR SUBSEQUENT PRENATAL CARE: ICD-10-PCS | Mod: CPTII,S$GLB,, | Performed by: OBSTETRICS & GYNECOLOGY

## 2023-12-14 PROCEDURE — 0502F SUBSEQUENT PRENATAL CARE: CPT | Mod: CPTII,S$GLB,, | Performed by: OBSTETRICS & GYNECOLOGY

## 2023-12-14 RX ORDER — PROMETHAZINE HYDROCHLORIDE 25 MG/1
25 TABLET ORAL EVERY 6 HOURS PRN
Qty: 15 TABLET | Refills: 0 | Status: SHIPPED | OUTPATIENT
Start: 2023-12-14 | End: 2023-12-28

## 2023-12-14 NOTE — PROGRESS NOTES
Complaints today: Ms. Burns reports that she continues to have nausea, back pain and pelvic pain. Pain is worse at night after work. Normal fetal movements with no vaginal bleeding, LOF or contractions at this time.     /60   Wt 84.5 kg (186 lb 2.9 oz)   LMP 2023 (Exact Date)   BMI 29.16 kg/m²     28 y.o., at 32w6d by Estimated Date of Delivery: 24  Patient Active Problem List   Diagnosis    Acute nasopharyngitis    Supervision of other normal pregnancy, antepartum    History of  section     OB History    Para Term  AB Living   4 1 1   2 1   SAB IAB Ectopic Multiple Live Births                  # Outcome Date GA Lbr Car/2nd Weight Sex Delivery Anes PTL Lv   4 Current            3 Term 2018     CS-LTranv      2 AB            1 AB                Dating reviewed    Allergies and problem list reviewed and updated    Medical and surgical history reviewed    Prenatal labs reviewed and updated    Physical Exam:  ABD: soft, gravid, nontender, 33 cm    Assessment:  Michelle was seen today for routine prenatal visit.    Diagnoses and all orders for this visit:    Supervision of other normal pregnancy, antepartum  - Continues to have back and pelvic pain  - Will take some time off of work to see if that helps with pain  - Will start medication for nausea; zofran is not working      No orders of the defined types were placed in this encounter.      Follow up in about 2 weeks (around 2023) for Routine OB.

## 2023-12-16 NOTE — PROGRESS NOTES
Complaints today:Ms. Burns continue to complain of back and pelvic pain.  She reports that she will try to not work as hard at work. Normal fetal movements with no vaginal bleeding, LOF or contractions at this time.     /60   Wt 84.9 kg (187 lb 2.7 oz)   LMP 2023 (Exact Date)   BMI 29.32 kg/m²     28 y.o., at 33w1d by Estimated Date of Delivery: 24  Patient Active Problem List   Diagnosis    Acute nasopharyngitis    Supervision of other normal pregnancy, antepartum    History of  section     OB History    Para Term  AB Living   4 1 1   2 1   SAB IAB Ectopic Multiple Live Births                  # Outcome Date GA Lbr Car/2nd Weight Sex Delivery Anes PTL Lv   4 Current            3 Term 2018     CS-LTranv      2 AB            1 AB                Dating reviewed    Allergies and problem list reviewed and updated    Medical and surgical history reviewed    Prenatal labs reviewed and updated    Physical Exam:  ABD: soft, gravid, nontender, 32cm    Assessment:  Michelle was seen today for routine prenatal visit.    Diagnoses and all orders for this visit:    Supervision of other normal pregnancy, antepartum  - Doing well  - Discussed with patient ways to help with her back and pelvic pain    No orders of the defined types were placed in this encounter.      Follow up in about 2 weeks (around 2023) for Routine OB.

## 2023-12-26 ENCOUNTER — PATIENT MESSAGE (OUTPATIENT)
Dept: OBSTETRICS AND GYNECOLOGY | Facility: CLINIC | Age: 28
End: 2023-12-26
Payer: COMMERCIAL

## 2023-12-28 ENCOUNTER — ROUTINE PRENATAL (OUTPATIENT)
Dept: OBSTETRICS AND GYNECOLOGY | Facility: CLINIC | Age: 28
End: 2023-12-28
Payer: COMMERCIAL

## 2023-12-28 VITALS
WEIGHT: 184.63 LBS | DIASTOLIC BLOOD PRESSURE: 60 MMHG | SYSTOLIC BLOOD PRESSURE: 108 MMHG | BODY MASS INDEX: 28.92 KG/M2

## 2023-12-28 DIAGNOSIS — Z34.80 SUPERVISION OF OTHER NORMAL PREGNANCY, ANTEPARTUM: Primary | ICD-10-CM

## 2023-12-28 DIAGNOSIS — O21.9 NAUSEA AND VOMITING IN PREGNANCY: ICD-10-CM

## 2023-12-28 PROCEDURE — 0502F SUBSEQUENT PRENATAL CARE: CPT | Mod: CPTII,S$GLB,, | Performed by: OBSTETRICS & GYNECOLOGY

## 2023-12-28 PROCEDURE — 99999 PR PBB SHADOW E&M-EST. PATIENT-LVL III: CPT | Mod: PBBFAC,,, | Performed by: OBSTETRICS & GYNECOLOGY

## 2023-12-28 PROCEDURE — 99999 PR PBB SHADOW E&M-EST. PATIENT-LVL III: ICD-10-PCS | Mod: PBBFAC,,, | Performed by: OBSTETRICS & GYNECOLOGY

## 2023-12-28 PROCEDURE — 0502F PR SUBSEQUENT PRENATAL CARE: ICD-10-PCS | Mod: CPTII,S$GLB,, | Performed by: OBSTETRICS & GYNECOLOGY

## 2023-12-28 RX ORDER — PROMETHAZINE HYDROCHLORIDE 12.5 MG/1
12.5 SUPPOSITORY RECTAL EVERY 6 HOURS PRN
Qty: 12 SUPPOSITORY | Refills: 0 | Status: ON HOLD | OUTPATIENT
Start: 2023-12-28 | End: 2024-01-26

## 2023-12-28 NOTE — PROGRESS NOTES
Complaints today: Ms. Burns continue to have abdominal pain but back pain improving since resting from work. Continues to have nausea. Phenergan helps but causes her to feel sleepy.     Normal fetal movements with no vaginal bleeding, LOF or contractions at this time.     /60   Wt 83.7 kg (184 lb 10.2 oz)   LMP 2023 (Exact Date)   BMI 28.92 kg/m²     28 y.o., at 34w6d by Estimated Date of Delivery: 24  Patient Active Problem List   Diagnosis    Acute nasopharyngitis    Supervision of other normal pregnancy, antepartum    History of  section     OB History    Para Term  AB Living   4 1 1   2 1   SAB IAB Ectopic Multiple Live Births                  # Outcome Date GA Lbr Car/2nd Weight Sex Delivery Anes PTL Lv   4 Current            3 Term 2018     CS-LTranv      2 AB            1 AB                Dating reviewed    Allergies and problem list reviewed and updated    Medical and surgical history reviewed    Prenatal labs reviewed and updated    Physical Exam:  ABD: soft, gravid, nontender, 35cm    Assessment:  Michelle was seen today for routine prenatal visit.    Diagnoses and all orders for this visit:    Supervision of other normal pregnancy, antepartum  - Doing well    Nausea and vomiting in pregnancy  -     promethazine (PHENERGAN) 12.5 MG Supp; Place 1 suppository (12.5 mg total) rectally every 6 (six) hours as needed (nausea).        No orders of the defined types were placed in this encounter.      Follow up in about 2 weeks (around 2024) for Routine OB.

## 2023-12-29 ENCOUNTER — PATIENT MESSAGE (OUTPATIENT)
Dept: OTHER | Facility: OTHER | Age: 28
End: 2023-12-29
Payer: COMMERCIAL

## 2024-01-11 ENCOUNTER — ROUTINE PRENATAL (OUTPATIENT)
Dept: OBSTETRICS AND GYNECOLOGY | Facility: CLINIC | Age: 29
End: 2024-01-11
Payer: COMMERCIAL

## 2024-01-11 VITALS
SYSTOLIC BLOOD PRESSURE: 114 MMHG | DIASTOLIC BLOOD PRESSURE: 70 MMHG | BODY MASS INDEX: 29.02 KG/M2 | WEIGHT: 185.31 LBS

## 2024-01-11 DIAGNOSIS — Z98.891 HISTORY OF CESAREAN SECTION: ICD-10-CM

## 2024-01-11 DIAGNOSIS — Z34.80 SUPERVISION OF OTHER NORMAL PREGNANCY, ANTEPARTUM: Primary | ICD-10-CM

## 2024-01-11 PROCEDURE — 0502F SUBSEQUENT PRENATAL CARE: CPT | Mod: CPTII,S$GLB,, | Performed by: OBSTETRICS & GYNECOLOGY

## 2024-01-11 PROCEDURE — 87491 CHLMYD TRACH DNA AMP PROBE: CPT | Performed by: OBSTETRICS & GYNECOLOGY

## 2024-01-11 PROCEDURE — 99999 PR PBB SHADOW E&M-EST. PATIENT-LVL III: CPT | Mod: PBBFAC,,, | Performed by: OBSTETRICS & GYNECOLOGY

## 2024-01-11 PROCEDURE — 87081 CULTURE SCREEN ONLY: CPT | Performed by: OBSTETRICS & GYNECOLOGY

## 2024-01-14 LAB — BACTERIA SPEC AEROBE CULT: NORMAL

## 2024-01-14 NOTE — PROGRESS NOTES
Complaints today:   Ms. Burns reports that she is doing well with no complaints. Normal fetal movements with no vaginal bleeding, LOF or contractions at this time.     /70   Wt 84.1 kg (185 lb 4.8 oz)   LMP 2023 (Exact Date)   BMI 29.02 kg/m²     29 y.o., at 37w2d by Estimated Date of Delivery: 24  Patient Active Problem List   Diagnosis    Acute nasopharyngitis    Supervision of other normal pregnancy, antepartum    History of  section     OB History    Para Term  AB Living   4 1 1   2 1   SAB IAB Ectopic Multiple Live Births                  # Outcome Date GA Lbr Car/2nd Weight Sex Delivery Anes PTL Lv   4 Current            3 Term 2018     CS-LTranv      2 AB            1 AB                Dating reviewed    Allergies and problem list reviewed and updated    Medical and surgical history reviewed    Prenatal labs reviewed and updated    Physical Exam:  ABD: soft, gravid, nontender, 37cm    Assessment:  Michelle was seen today for routine prenatal visit.    Diagnoses and all orders for this visit:    Supervision of other normal pregnancy, antepartum  -     HIV 1/2 Ag/Ab (4th Gen); Future  -     RPR; Future  -     Hepatitis Panel, Acute; Future  -     C. trachomatis/N. gonorrhoeae by AMP DNA  -     Strep B Screen, Vaginal / Rectal    History of  section  - Discussed  in detail with patient      Orders Placed This Encounter   Procedures    C. trachomatis/N. gonorrhoeae by AMP DNA    Strep B Screen, Vaginal / Rectal    HIV 1/2 Ag/Ab (4th Gen)    RPR    Hepatitis Panel, Acute       Follow up in about 1 week (around 2024) for Routine OB.

## 2024-01-16 LAB
C TRACH DNA SPEC QL NAA+PROBE: NOT DETECTED
N GONORRHOEA DNA SPEC QL NAA+PROBE: NOT DETECTED

## 2024-01-17 ENCOUNTER — PATIENT MESSAGE (OUTPATIENT)
Dept: OBSTETRICS AND GYNECOLOGY | Facility: CLINIC | Age: 29
End: 2024-01-17
Payer: COMMERCIAL

## 2024-01-18 ENCOUNTER — ROUTINE PRENATAL (OUTPATIENT)
Dept: OBSTETRICS AND GYNECOLOGY | Facility: CLINIC | Age: 29
End: 2024-01-18
Payer: COMMERCIAL

## 2024-01-18 ENCOUNTER — LAB VISIT (OUTPATIENT)
Dept: LAB | Facility: HOSPITAL | Age: 29
End: 2024-01-18
Attending: OBSTETRICS & GYNECOLOGY
Payer: COMMERCIAL

## 2024-01-18 VITALS — DIASTOLIC BLOOD PRESSURE: 60 MMHG | BODY MASS INDEX: 29.8 KG/M2 | SYSTOLIC BLOOD PRESSURE: 112 MMHG | WEIGHT: 190.25 LBS

## 2024-01-18 DIAGNOSIS — Z98.891 HISTORY OF CESAREAN SECTION: ICD-10-CM

## 2024-01-18 DIAGNOSIS — Z3A.37 37 WEEKS GESTATION OF PREGNANCY: Primary | ICD-10-CM

## 2024-01-18 DIAGNOSIS — Z34.80 SUPERVISION OF OTHER NORMAL PREGNANCY, ANTEPARTUM: ICD-10-CM

## 2024-01-18 LAB
ABO + RH BLD: NORMAL
BLD GP AB SCN CELLS X3 SERPL QL: NORMAL
SPECIMEN OUTDATE: NORMAL

## 2024-01-18 PROCEDURE — 86592 SYPHILIS TEST NON-TREP QUAL: CPT | Performed by: OBSTETRICS & GYNECOLOGY

## 2024-01-18 PROCEDURE — 80074 ACUTE HEPATITIS PANEL: CPT | Performed by: OBSTETRICS & GYNECOLOGY

## 2024-01-18 PROCEDURE — 99999 PR PBB SHADOW E&M-EST. PATIENT-LVL II: CPT | Mod: PBBFAC,,,

## 2024-01-18 PROCEDURE — 36415 COLL VENOUS BLD VENIPUNCTURE: CPT | Performed by: OBSTETRICS & GYNECOLOGY

## 2024-01-18 PROCEDURE — 87389 HIV-1 AG W/HIV-1&-2 AB AG IA: CPT | Performed by: OBSTETRICS & GYNECOLOGY

## 2024-01-18 PROCEDURE — 86850 RBC ANTIBODY SCREEN: CPT | Performed by: OBSTETRICS & GYNECOLOGY

## 2024-01-18 PROCEDURE — 0502F SUBSEQUENT PRENATAL CARE: CPT | Mod: CPTII,S$GLB,,

## 2024-01-18 NOTE — PROGRESS NOTES
37w6d. Pt reports no complaints today  Denies contractions, vaginal bleeding, or leakage of fluid.  Reports adequate fetal movement.  Trisomy screening: neg    Tdap   RSV   Wants to wait until next week for SVE. Questions about  answered.       /60   Wt 86.3 kg (190 lb 4.1 oz)   LMP 2023 (Exact Date)   BMI 29.80 kg/m²     29 y.o., at 37w6d by Estimated Date of Delivery: 24  Patient Active Problem List   Diagnosis    Acute nasopharyngitis    Supervision of other normal pregnancy, antepartum    History of  section     OB History    Para Term  AB Living   4 1 1   2 1   SAB IAB Ectopic Multiple Live Births                  # Outcome Date GA Lbr Car/2nd Weight Sex Delivery Anes PTL Lv   4 Current            3 Term 2018     CS-LTranv      2 AB            1 AB                Dating reviewed    Allergies and problem list reviewed and updated    Medical and surgical history reviewed    Prenatal labs reviewed and updated    Physical Exam:  ABD: soft, gravid, nontender, 38 cm    Assessment:  Michelle Burns presents today for her AP visit at 37w6d.    Plan:   1. 37 weeks gestation of pregnancy    2. Supervision of other normal pregnancy, antepartum    3. History of  section       F/U 1 week with Dr Ashwin Jewell.  Next visit: routine care    Bleeding/pain precautions, kick counts, labor precautions  given.    SHAKEEL Swain-BC

## 2024-01-19 LAB
HAV IGM SERPL QL IA: NORMAL
HBV CORE IGM SERPL QL IA: NORMAL
HBV SURFACE AG SERPL QL IA: NORMAL
HCV AB SERPL QL IA: NORMAL
HIV 1+2 AB+HIV1 P24 AG SERPL QL IA: NORMAL
RPR SER QL: NORMAL

## 2024-01-22 ENCOUNTER — PATIENT MESSAGE (OUTPATIENT)
Dept: OBSTETRICS AND GYNECOLOGY | Facility: CLINIC | Age: 29
End: 2024-01-22
Payer: COMMERCIAL

## 2024-01-25 ENCOUNTER — TELEPHONE (OUTPATIENT)
Dept: OBSTETRICS AND GYNECOLOGY | Facility: HOSPITAL | Age: 29
End: 2024-01-25
Payer: COMMERCIAL

## 2024-01-25 DIAGNOSIS — Z98.891 HISTORY OF CESAREAN SECTION: Primary | ICD-10-CM

## 2024-01-26 ENCOUNTER — ANESTHESIA (OUTPATIENT)
Dept: OBSTETRICS AND GYNECOLOGY | Facility: HOSPITAL | Age: 29
End: 2024-01-26
Payer: COMMERCIAL

## 2024-01-26 ENCOUNTER — ANESTHESIA EVENT (OUTPATIENT)
Dept: OBSTETRICS AND GYNECOLOGY | Facility: HOSPITAL | Age: 29
End: 2024-01-26
Payer: COMMERCIAL

## 2024-01-26 ENCOUNTER — HOSPITAL ENCOUNTER (INPATIENT)
Facility: HOSPITAL | Age: 29
LOS: 2 days | Discharge: HOME | End: 2024-01-28
Attending: OBSTETRICS & GYNECOLOGY | Admitting: OBSTETRICS & GYNECOLOGY
Payer: COMMERCIAL

## 2024-01-26 DIAGNOSIS — Z98.891 STATUS POST PRIMARY LOW TRANSVERSE CESAREAN SECTION: Primary | ICD-10-CM

## 2024-01-26 DIAGNOSIS — Z98.891 HISTORY OF CESAREAN SECTION: ICD-10-CM

## 2024-01-26 DIAGNOSIS — Z98.891 HISTORY OF CESAREAN DELIVERY: ICD-10-CM

## 2024-01-26 LAB
ABO + RH BLD: NORMAL
BASOPHILS # BLD AUTO: 0.04 K/UL (ref 0–0.2)
BASOPHILS NFR BLD: 0.5 % (ref 0–1.9)
BLD GP AB SCN CELLS X3 SERPL QL: NORMAL
DIFFERENTIAL METHOD BLD: ABNORMAL
EOSINOPHIL # BLD AUTO: 0.2 K/UL (ref 0–0.5)
EOSINOPHIL NFR BLD: 2.9 % (ref 0–8)
ERYTHROCYTE [DISTWIDTH] IN BLOOD BY AUTOMATED COUNT: 15.2 % (ref 11.5–14.5)
HCT VFR BLD AUTO: 29.9 % (ref 37–48.5)
HGB BLD-MCNC: 9.1 G/DL (ref 12–16)
IMM GRANULOCYTES # BLD AUTO: 0.07 K/UL (ref 0–0.04)
IMM GRANULOCYTES NFR BLD AUTO: 0.9 % (ref 0–0.5)
LYMPHOCYTES # BLD AUTO: 1 K/UL (ref 1–4.8)
LYMPHOCYTES NFR BLD: 13.1 % (ref 18–48)
MCH RBC QN AUTO: 24.6 PG (ref 27–31)
MCHC RBC AUTO-ENTMCNC: 30.4 G/DL (ref 32–36)
MCV RBC AUTO: 81 FL (ref 82–98)
MONOCYTES # BLD AUTO: 0.8 K/UL (ref 0.3–1)
MONOCYTES NFR BLD: 10.7 % (ref 4–15)
NEUTROPHILS # BLD AUTO: 5.5 K/UL (ref 1.8–7.7)
NEUTROPHILS NFR BLD: 71.9 % (ref 38–73)
NRBC BLD-RTO: 0 /100 WBC
PLATELET # BLD AUTO: 170 K/UL (ref 150–450)
PMV BLD AUTO: 12.6 FL (ref 9.2–12.9)
RBC # BLD AUTO: 3.7 M/UL (ref 4–5.4)
WBC # BLD AUTO: 7.63 K/UL (ref 3.9–12.7)

## 2024-01-26 PROCEDURE — 85025 COMPLETE CBC W/AUTO DIFF WBC: CPT | Performed by: OBSTETRICS & GYNECOLOGY

## 2024-01-26 PROCEDURE — 51702 INSERT TEMP BLADDER CATH: CPT

## 2024-01-26 PROCEDURE — 36004724 HC OB OR TIME LEV III - 1ST 15 MIN: Performed by: OBSTETRICS & GYNECOLOGY

## 2024-01-26 PROCEDURE — 25000003 PHARM REV CODE 250: Performed by: STUDENT IN AN ORGANIZED HEALTH CARE EDUCATION/TRAINING PROGRAM

## 2024-01-26 PROCEDURE — 59514 CESAREAN DELIVERY ONLY: CPT | Mod: 80,GB,, | Performed by: OBSTETRICS & GYNECOLOGY

## 2024-01-26 PROCEDURE — 25000003 PHARM REV CODE 250: Performed by: ANESTHESIOLOGY

## 2024-01-26 PROCEDURE — 59510 CESAREAN DELIVERY: CPT | Mod: GB,,, | Performed by: OBSTETRICS & GYNECOLOGY

## 2024-01-26 PROCEDURE — 63600175 PHARM REV CODE 636 W HCPCS: Performed by: STUDENT IN AN ORGANIZED HEALTH CARE EDUCATION/TRAINING PROGRAM

## 2024-01-26 PROCEDURE — 11000001 HC ACUTE MED/SURG PRIVATE ROOM

## 2024-01-26 PROCEDURE — 71000039 HC RECOVERY, EACH ADD'L HOUR: Performed by: OBSTETRICS & GYNECOLOGY

## 2024-01-26 PROCEDURE — 63600175 PHARM REV CODE 636 W HCPCS: Performed by: ANESTHESIOLOGY

## 2024-01-26 PROCEDURE — 63600175 PHARM REV CODE 636 W HCPCS: Performed by: OBSTETRICS & GYNECOLOGY

## 2024-01-26 PROCEDURE — 71000033 HC RECOVERY, INTIAL HOUR: Performed by: OBSTETRICS & GYNECOLOGY

## 2024-01-26 PROCEDURE — 59025 FETAL NON-STRESS TEST: CPT

## 2024-01-26 PROCEDURE — 25000003 PHARM REV CODE 250: Performed by: OBSTETRICS & GYNECOLOGY

## 2024-01-26 PROCEDURE — 86901 BLOOD TYPING SEROLOGIC RH(D): CPT | Performed by: OBSTETRICS & GYNECOLOGY

## 2024-01-26 PROCEDURE — 27201423 OPTIME MED/SURG SUP & DEVICES STERILE SUPPLY: Performed by: OBSTETRICS & GYNECOLOGY

## 2024-01-26 PROCEDURE — 36004725 HC OB OR TIME LEV III - EA ADD 15 MIN: Performed by: OBSTETRICS & GYNECOLOGY

## 2024-01-26 PROCEDURE — 59514 CESAREAN DELIVERY ONLY: CPT | Mod: QX,CRNA,, | Performed by: NURSE ANESTHETIST, CERTIFIED REGISTERED

## 2024-01-26 PROCEDURE — 86592 SYPHILIS TEST NON-TREP QUAL: CPT | Performed by: OBSTETRICS & GYNECOLOGY

## 2024-01-26 PROCEDURE — 59514 CESAREAN DELIVERY ONLY: CPT | Mod: QY,ANES,, | Performed by: ANESTHESIOLOGY

## 2024-01-26 PROCEDURE — 37000009 HC ANESTHESIA EA ADD 15 MINS: Performed by: OBSTETRICS & GYNECOLOGY

## 2024-01-26 PROCEDURE — 63600175 PHARM REV CODE 636 W HCPCS: Performed by: NURSE ANESTHETIST, CERTIFIED REGISTERED

## 2024-01-26 PROCEDURE — 37000008 HC ANESTHESIA 1ST 15 MINUTES: Performed by: OBSTETRICS & GYNECOLOGY

## 2024-01-26 RX ORDER — FAMOTIDINE 10 MG/ML
20 INJECTION INTRAVENOUS
Status: DISCONTINUED | OUTPATIENT
Start: 2024-01-26 | End: 2024-01-28

## 2024-01-26 RX ORDER — CEFAZOLIN SODIUM 2 G/50ML
2 SOLUTION INTRAVENOUS
Status: COMPLETED | OUTPATIENT
Start: 2024-01-26 | End: 2024-01-26

## 2024-01-26 RX ORDER — OXYTOCIN/RINGER'S LACTATE 30/500 ML
PLASTIC BAG, INJECTION (ML) INTRAVENOUS
Status: DISCONTINUED | OUTPATIENT
Start: 2024-01-26 | End: 2024-01-26

## 2024-01-26 RX ORDER — KETOROLAC TROMETHAMINE 30 MG/ML
30 INJECTION, SOLUTION INTRAMUSCULAR; INTRAVENOUS EVERY 6 HOURS
Status: DISCONTINUED | OUTPATIENT
Start: 2024-01-26 | End: 2024-01-26

## 2024-01-26 RX ORDER — MUPIROCIN 20 MG/G
OINTMENT TOPICAL
Status: DISCONTINUED | OUTPATIENT
Start: 2024-01-26 | End: 2024-01-28

## 2024-01-26 RX ORDER — MISOPROSTOL 200 UG/1
800 TABLET ORAL
Status: DISCONTINUED | OUTPATIENT
Start: 2024-01-26 | End: 2024-01-28

## 2024-01-26 RX ORDER — OXYTOCIN 10 [USP'U]/ML
INJECTION, SOLUTION INTRAMUSCULAR; INTRAVENOUS
Status: DISCONTINUED | OUTPATIENT
Start: 2024-01-26 | End: 2024-01-26

## 2024-01-26 RX ORDER — KETOROLAC TROMETHAMINE 30 MG/ML
30 INJECTION, SOLUTION INTRAMUSCULAR; INTRAVENOUS EVERY 6 HOURS
Status: COMPLETED | OUTPATIENT
Start: 2024-01-26 | End: 2024-01-27

## 2024-01-26 RX ORDER — FAMOTIDINE 10 MG/ML
20 INJECTION INTRAVENOUS ONCE
Status: COMPLETED | OUTPATIENT
Start: 2024-01-26 | End: 2024-01-26

## 2024-01-26 RX ORDER — SODIUM CITRATE AND CITRIC ACID MONOHYDRATE 334; 500 MG/5ML; MG/5ML
30 SOLUTION ORAL ONCE
Status: COMPLETED | OUTPATIENT
Start: 2024-01-26 | End: 2024-01-26

## 2024-01-26 RX ORDER — OXYCODONE HYDROCHLORIDE 5 MG/1
5 TABLET ORAL EVERY 4 HOURS PRN
Status: ACTIVE | OUTPATIENT
Start: 2024-01-26 | End: 2024-01-27

## 2024-01-26 RX ORDER — FENTANYL CITRATE 50 UG/ML
INJECTION, SOLUTION INTRAMUSCULAR; INTRAVENOUS
Status: DISCONTINUED | OUTPATIENT
Start: 2024-01-26 | End: 2024-01-26

## 2024-01-26 RX ORDER — HYDROCODONE BITARTRATE AND ACETAMINOPHEN 5; 325 MG/1; MG/1
1 TABLET ORAL EVERY 8 HOURS PRN
Qty: 30 TABLET | Refills: 0 | Status: SHIPPED | OUTPATIENT
Start: 2024-01-26

## 2024-01-26 RX ORDER — MUPIROCIN 20 MG/G
OINTMENT TOPICAL 2 TIMES DAILY
Status: DISCONTINUED | OUTPATIENT
Start: 2024-01-26 | End: 2024-01-28

## 2024-01-26 RX ORDER — CARBOPROST TROMETHAMINE 250 UG/ML
250 INJECTION, SOLUTION INTRAMUSCULAR
Status: DISCONTINUED | OUTPATIENT
Start: 2024-01-26 | End: 2024-01-28

## 2024-01-26 RX ORDER — OXYCODONE HYDROCHLORIDE 5 MG/1
10 TABLET ORAL EVERY 4 HOURS PRN
Status: ACTIVE | OUTPATIENT
Start: 2024-01-26 | End: 2024-01-27

## 2024-01-26 RX ORDER — ONDANSETRON HYDROCHLORIDE 2 MG/ML
4 INJECTION, SOLUTION INTRAVENOUS EVERY 6 HOURS PRN
Status: DISPENSED | OUTPATIENT
Start: 2024-01-26 | End: 2024-01-27

## 2024-01-26 RX ORDER — DOCUSATE SODIUM 100 MG/1
200 CAPSULE, LIQUID FILLED ORAL 2 TIMES DAILY
Status: DISCONTINUED | OUTPATIENT
Start: 2024-01-26 | End: 2024-01-28

## 2024-01-26 RX ORDER — BUPIVACAINE HYDROCHLORIDE 7.5 MG/ML
INJECTION, SOLUTION INTRASPINAL
Status: DISCONTINUED | OUTPATIENT
Start: 2024-01-26 | End: 2024-01-26

## 2024-01-26 RX ORDER — IBUPROFEN 800 MG/1
800 TABLET ORAL EVERY 8 HOURS PRN
Qty: 40 TABLET | Refills: 0 | Status: SHIPPED | OUTPATIENT
Start: 2024-01-26

## 2024-01-26 RX ORDER — MORPHINE SULFATE 0.5 MG/ML
INJECTION, SOLUTION EPIDURAL; INTRATHECAL; INTRAVENOUS
Status: DISCONTINUED | OUTPATIENT
Start: 2024-01-26 | End: 2024-01-26

## 2024-01-26 RX ORDER — PROCHLORPERAZINE EDISYLATE 5 MG/ML
5 INJECTION INTRAMUSCULAR; INTRAVENOUS EVERY 6 HOURS PRN
Status: DISCONTINUED | OUTPATIENT
Start: 2024-01-26 | End: 2024-01-28 | Stop reason: SDUPTHER

## 2024-01-26 RX ORDER — DEXTROSE, SODIUM CHLORIDE, SODIUM LACTATE, POTASSIUM CHLORIDE, AND CALCIUM CHLORIDE 5; .6; .31; .03; .02 G/100ML; G/100ML; G/100ML; G/100ML; G/100ML
INJECTION, SOLUTION INTRAVENOUS CONTINUOUS PRN
Status: DISCONTINUED | OUTPATIENT
Start: 2024-01-26 | End: 2024-01-26

## 2024-01-26 RX ORDER — OXYTOCIN/RINGER'S LACTATE 30/500 ML
334 PLASTIC BAG, INJECTION (ML) INTRAVENOUS ONCE
Status: DISCONTINUED | OUTPATIENT
Start: 2024-01-26 | End: 2024-01-28

## 2024-01-26 RX ORDER — ACETAMINOPHEN 325 MG/1
650 TABLET ORAL EVERY 6 HOURS
Status: COMPLETED | OUTPATIENT
Start: 2024-01-26 | End: 2024-01-27

## 2024-01-26 RX ORDER — METHYLERGONOVINE MALEATE 0.2 MG/ML
200 INJECTION INTRAVENOUS
Status: DISCONTINUED | OUTPATIENT
Start: 2024-01-26 | End: 2024-01-28

## 2024-01-26 RX ORDER — PHENYLEPHRINE HYDROCHLORIDE 10 MG/ML
INJECTION INTRAVENOUS
Status: DISCONTINUED | OUTPATIENT
Start: 2024-01-26 | End: 2024-01-26

## 2024-01-26 RX ORDER — SODIUM CHLORIDE, SODIUM LACTATE, POTASSIUM CHLORIDE, CALCIUM CHLORIDE 600; 310; 30; 20 MG/100ML; MG/100ML; MG/100ML; MG/100ML
INJECTION, SOLUTION INTRAVENOUS CONTINUOUS
Status: DISCONTINUED | OUTPATIENT
Start: 2024-01-26 | End: 2024-01-28

## 2024-01-26 RX ORDER — OXYTOCIN/RINGER'S LACTATE 30/500 ML
95 PLASTIC BAG, INJECTION (ML) INTRAVENOUS ONCE
Status: DISCONTINUED | OUTPATIENT
Start: 2024-01-26 | End: 2024-01-28

## 2024-01-26 RX ORDER — SODIUM CITRATE AND CITRIC ACID MONOHYDRATE 334; 500 MG/5ML; MG/5ML
30 SOLUTION ORAL
Status: DISCONTINUED | OUTPATIENT
Start: 2024-01-26 | End: 2024-01-28

## 2024-01-26 RX ADMIN — SODIUM CHLORIDE, POTASSIUM CHLORIDE, SODIUM LACTATE AND CALCIUM CHLORIDE 1000 ML: 600; 310; 30; 20 INJECTION, SOLUTION INTRAVENOUS at 10:01

## 2024-01-26 RX ADMIN — BUPIVACAINE HYDROCHLORIDE IN DEXTROSE 1.6 ML: 7.5 INJECTION, SOLUTION SUBARACHNOID at 11:01

## 2024-01-26 RX ADMIN — FENTANYL CITRATE 10 MCG: 0.05 INJECTION, SOLUTION INTRAMUSCULAR; INTRAVENOUS at 11:01

## 2024-01-26 RX ADMIN — MORPHINE SULFATE 0.1 MG: 0.5 INJECTION, SOLUTION EPIDURAL; INTRATHECAL; INTRAVENOUS at 11:01

## 2024-01-26 RX ADMIN — MUPIROCIN: 20 OINTMENT TOPICAL at 10:01

## 2024-01-26 RX ADMIN — FAMOTIDINE 20 MG: 10 INJECTION INTRAVENOUS at 10:01

## 2024-01-26 RX ADMIN — ONDANSETRON 4 MG: 2 INJECTION INTRAMUSCULAR; INTRAVENOUS at 03:01

## 2024-01-26 RX ADMIN — PHENYLEPHRINE HYDROCHLORIDE 100 MCG: 10 INJECTION INTRAVENOUS at 11:01

## 2024-01-26 RX ADMIN — SODIUM CITRATE AND CITRIC ACID MONOHYDRATE 30 ML: 500; 334 SOLUTION ORAL at 10:01

## 2024-01-26 RX ADMIN — ACETAMINOPHEN 650 MG: 325 TABLET ORAL at 06:01

## 2024-01-26 RX ADMIN — CEFAZOLIN SODIUM 2 G: 2 SOLUTION INTRAVENOUS at 11:01

## 2024-01-26 RX ADMIN — FENTANYL CITRATE 90 MCG: 50 INJECTION, SOLUTION INTRAMUSCULAR; INTRAVENOUS at 12:01

## 2024-01-26 RX ADMIN — DEXTROSE, SODIUM CHLORIDE, SODIUM LACTATE, POTASSIUM CHLORIDE, AND CALCIUM CHLORIDE: 5; .6; .31; .03; .02 INJECTION, SOLUTION INTRAVENOUS at 11:01

## 2024-01-26 RX ADMIN — PHENYLEPHRINE HYDROCHLORIDE 100 MCG: 10 INJECTION INTRAVENOUS at 12:01

## 2024-01-26 RX ADMIN — Medication 95 ML: at 12:01

## 2024-01-26 RX ADMIN — OXYTOCIN 40 UNITS: 10 INJECTION, SOLUTION INTRAMUSCULAR; INTRAVENOUS at 12:01

## 2024-01-26 RX ADMIN — KETOROLAC TROMETHAMINE 30 MG: 30 INJECTION, SOLUTION INTRAMUSCULAR; INTRAVENOUS at 03:01

## 2024-01-26 RX ADMIN — DOCUSATE SODIUM 200 MG: 100 CAPSULE, LIQUID FILLED ORAL at 09:01

## 2024-01-26 NOTE — SUBJECTIVE & OBJECTIVE
OB History    Para Term  AB Living   4 1 1 0 2 1   SAB IAB Ectopic Multiple Live Births   0 0 0 0 0      # Outcome Date GA Lbr Car/2nd Weight Sex Delivery Anes PTL Lv   4 Current            3 Term 2018     CS-LTranv      2 AB            1 AB              Past Medical History:   Diagnosis Date    History of abnormal cervical Pap smear     History of HPV infection      Past Surgical History:   Procedure Laterality Date     SECTION  2018       PTA Medications   Medication Sig    ondansetron (ZOFRAN-ODT) 4 MG TbDL Take 2 tablets (8 mg total) by mouth every 8 (eight) hours.    PNV,calcium 72-iron-folic acid (PRENATAL VITAMIN PLUS LOW IRON) 27 mg iron- 1 mg Tab Take one tablet daily.  Prescribe prenatal covered by insurance    promethazine (PHENERGAN) 12.5 MG Supp Place 1 suppository (12.5 mg total) rectally every 6 (six) hours as needed (nausea).       Review of patient's allergies indicates:  No Known Allergies     Family History       Problem Relation (Age of Onset)    Diabetes Mother    Hypertension Mother          Tobacco Use    Smoking status: Never    Smokeless tobacco: Not on file   Substance and Sexual Activity    Alcohol use: No    Drug use: No    Sexual activity: Not on file     Review of Systems   Constitutional:  Negative for chills and fever.   Eyes:  Negative for visual disturbance.   Respiratory:  Negative for cough and wheezing.    Cardiovascular:  Negative for chest pain and palpitations.   Gastrointestinal:  Negative for abdominal pain, nausea and vomiting.   Genitourinary:  Negative for dysuria, frequency, hematuria, pelvic pain, vaginal bleeding, vaginal discharge and vaginal pain.   Neurological:  Negative for headaches.   Psychiatric/Behavioral:  Negative for depression.       Objective:     Vital Signs (Most Recent):    Vital Signs (24h Range):  Pulse:  [] 105  SpO2:  [97 %-100 %] 100 %  BP: (109-129)/(59-73) 125/73        There is no height or weight on file to  "calculate BMI.    FHT: Cat 1 (reassuring)  TOCO:  Q 5-7 minutes     Physical Exam:   Constitutional: She is oriented to person, place, and time. She appears well-developed and well-nourished. No distress.       Cardiovascular:  Normal rate.             Pulmonary/Chest: Effort normal.        Abdominal: Soft. She exhibits no distension.                 Neurological: She is alert and oriented to person, place, and time.    Skin: Skin is warm and dry.    Psychiatric: She has a normal mood and affect.        Cervix: deferred     Significant Labs:  Lab Results   Component Value Date    GROUPTRH B POS 01/18/2024    HEPBSAG Non-reactive 01/18/2024    STREPBCULT No Group B Streptococcus isolated 01/11/2024       CBC: No results for input(s): "WBC", "RBC", "HGB", "HCT", "PLT", "MCV", "MCH", "MCHC" in the last 48 hours.  I have personallly reviewed all pertinent lab results from the last 24 hours.  "

## 2024-01-26 NOTE — LACTATION NOTE
01/26/24 1300   Maternal Assessment   Breast Density Bilateral:;soft   Areola Bilateral:;elastic   Nipples Bilateral:;flat;graspable   Maternal Infant Feeding   Maternal Emotional State independent   Infant Positioning cradle   Signs of Milk Transfer audible swallow;infant jaw motion present   Pain with Feeding no   Comfort Measures Before/During Feeding infant position adjusted;latch adjusted   Latch Assistance yes     Infant skin to skin with patient. Demonstrated hand expression-drops expressed on nipple tip. Assisted with latching on the right side in cradle position. Infant able to latch but tight lipped. Mother reports only feeling weak sucks. Readjusted latch-waited for infant to open wide and shaped breast to help infant bring in more areola tissue. With adjusted latch, infant able to obtain and sustain effective latch. Mother reports feeling stronger sucks. Suck and swallow verified. Patient denies pain with breastfeeding. Signs of an effective latch and hunger/satiety cues reviewed. Encouraged to feed infant on cue at least 8 in 24. Basic breastfeeding information reviewed utilizing breastfeeding guide. Patient verbalized understanding. All questions answered. Told to call for further lactation support needs.

## 2024-01-26 NOTE — ANESTHESIA PROCEDURE NOTES
Spinal    Diagnosis: IUP  Patient location during procedure: OR  Start time: 1/26/2024 11:41 AM  Timeout: 1/26/2024 11:40 AM  End time: 1/26/2024 11:44 AM    Staffing  Authorizing Provider: Augustin Bruno MD  Performing Provider: Dany Law MD    Staffing  Performed by: Dany Law MD  Authorized by: Augustin Bruno MD    Preanesthetic Checklist  Completed: patient identified, IV checked, site marked, risks and benefits discussed, surgical consent, monitors and equipment checked, pre-op evaluation and timeout performed  Spinal Block  Patient position: sitting  Prep: ChloraPrep  Patient monitoring: heart rate, cardiac monitor, continuous pulse ox and frequent blood pressure checks  Approach: midline  Location: L4-5  Injection technique: single shot  CSF Fluid: clear free-flowing CSF  Needle  Needle type: pencil-tip   Needle gauge: 24 G  Needle length: 3.5 in  Additional Documentation: incremental injection, negative aspiration for heme and no paresthesia on injection  Needle localization: anatomical landmarks  Assessment  Sensory level: T8   Dermatomal levels determined by pinch or prick  Ease of block: easy  Patient's tolerance of the procedure: comfortable throughout block and no complaints

## 2024-01-26 NOTE — H&P
[facility] - Labor & Delivery  Obstetrics  History & Physical    Patient Name: Michelle Burns  MRN: [id number]  Admission Date: 2024  Primary Care Provider: Iliana, Primary Doctor    Subjective:     Principal Problem:History of  section    History of Present Illness:   Michelle Burns is a 29 y.o.  female with IUP at 39w0d weeks gestation who is admitted for scheduled  Section secondary to history of  section.     This IUP is complicated by history of  section.  Patient reports contractions, denies vaginal bleeding, denies LOF.   Fetal Movement: normal.           OB History    Para Term  AB Living   4 1 1 0 2 1   SAB IAB Ectopic Multiple Live Births   0 0 0 0 0      # Outcome Date GA Lbr Car/2nd Weight Sex Delivery Anes PTL Lv   4 Current            3 Term 2018     CS-LTranv      2 AB            1 AB              Past Medical History:   Diagnosis Date    History of abnormal cervical Pap smear     History of HPV infection      Past Surgical History:   Procedure Laterality Date     SECTION  2018       PTA Medications   Medication Sig    ondansetron (ZOFRAN-ODT) 4 MG TbDL Take 2 tablets (8 mg total) by mouth every 8 (eight) hours.    PNV,calcium 72-iron-folic acid (PRENATAL VITAMIN PLUS LOW IRON) 27 mg iron- 1 mg Tab Take one tablet daily.  Prescribe prenatal covered by insurance    promethazine (PHENERGAN) 12.5 MG Supp Place 1 suppository (12.5 mg total) rectally every 6 (six) hours as needed (nausea).       Review of patient's allergies indicates:  No Known Allergies     Family History       Problem Relation (Age of Onset)    Diabetes Mother    Hypertension Mother          Tobacco Use    Smoking status: Never    Smokeless tobacco: Not on file   Substance and Sexual Activity    Alcohol use: No    Drug use: No    Sexual activity: Not on file     Review of Systems   Constitutional:  Negative for chills and fever.   Eyes:  Negative for visual disturbance.  "  Respiratory:  Negative for cough and wheezing.    Cardiovascular:  Negative for chest pain and palpitations.   Gastrointestinal:  Negative for abdominal pain, nausea and vomiting.   Genitourinary:  Negative for dysuria, frequency, hematuria, pelvic pain, vaginal bleeding, vaginal discharge and vaginal pain.   Neurological:  Negative for headaches.   Psychiatric/Behavioral:  Negative for depression.       Objective:     Vital Signs (Most Recent):    Vital Signs (24h Range):  Pulse:  [] 105  SpO2:  [97 %-100 %] 100 %  BP: (109-129)/(59-73) 125/73        There is no height or weight on file to calculate BMI.    FHT: Cat 1 (reassuring)  TOCO:  Q 5-7 minutes     Physical Exam:   Constitutional: She is oriented to person, place, and time. She appears well-developed and well-nourished. No distress.       Cardiovascular:  Normal rate.             Pulmonary/Chest: Effort normal.        Abdominal: Soft. She exhibits no distension.                 Neurological: She is alert and oriented to person, place, and time.    Skin: Skin is warm and dry.    Psychiatric: She has a normal mood and affect.        Cervix: deferred     Significant Labs:  Lab Results   Component Value Date    GROUPTRH B POS 2024    HEPBSAG Non-reactive 2024    STREPBCULT No Group B Streptococcus isolated 2024       CBC: No results for input(s): "WBC", "RBC", "HGB", "HCT", "PLT", "MCV", "MCH", "MCHC" in the last 48 hours.  I have personallly reviewed all pertinent lab results from the last 24 hours.  Assessment/Plan:     29 y.o. female  at 39w0d for:    * History of  section  - Admit to Labor & Delivery  - Plans for expectant management  - Place IV and start IVFs  - Type & Screen and CBC ordered STAT  - Continuous fetal monitoring  - Notify anesthesia of patient arrival  - Postpartum Hemorrhage risk: Medium          Aidee Scanlon MD  Obstetrics  [facility] - [place] - Labor & Delivery        "

## 2024-01-26 NOTE — TRANSFER OF CARE
"Anesthesia Transfer of Care Note    Patient: Michelle Burns    Procedure(s) Performed: Procedure(s) (LRB):   SECTION (N/A)    Patient location: Labor and Delivery    Anesthesia Type: spinal    Transport from OR: Transported from OR on room air with adequate spontaneous ventilation    Post pain: adequate analgesia    Post assessment: no apparent anesthetic complications and tolerated procedure well    Post vital signs: stable    Level of consciousness: awake, alert and oriented    Nausea/Vomiting: no nausea/vomiting    Complications: none    Transfer of care protocol was followed      Last vitals: Visit Vitals  Pulse 98   Temp 36.7 °C (98.1 °F)   Resp 17   Ht 5' 6" (1.676 m)   Wt 86.2 kg (190 lb)   LMP 2023 (Exact Date)   SpO2 99%   Breastfeeding Unknown   BMI 30.67 kg/m²     "

## 2024-01-26 NOTE — DISCHARGE INSTRUCTIONS
Breastfeeding Discharge Instructions     AAP recommendation of exclusive breastfeeding for the first 6 months of life and continued breastfeeding with the introduction of supplemental foods beyond the first year of life and recommends to delay all bottle and pacifier use until after 4 weeks of age and breastfeeding is well established.  Discussed the benefits of exclusive breastfeeding for both mother and baby.  Discussed the risks of supplementation/pacifier use on the exclusivity of breastfeeding in the first 6 months. Feed the baby at the earliest sign of hunger or comfort  Hands to mouth, sucking motions  Rooting or searching for something to suck on  Dont wait for crying - it is a not a late sign of hunger; it is a sign of distress    The feedings may be 8-12 times per 24hrs and will not follow a schedule  Alternate the breast you start the feeding with, or start with the breast that feels the fullest  Switch breasts when the baby takes himself off the breast or falls asleep  Keep offering breasts until the baby looks full, no longer gives hunger signs, and stays asleep when placed on his back in the crib  If the baby is sleepy and wont wake for a feeding, put the baby skin-to-skin dressed in a diaper against the mothers bare chest  Sleep near your baby  The baby should be positioned and latched on to the breast correctly  Chest-to-chest, chin in the breast  Babys lips are flipped outward  Babys mouth is stretched open wide like a shout  Babys sucking should feel like tugging to the mother  The baby should be drinking at the breast:  You should hear swallowing or gulping throughout the feeding  You should see milk on the babys lips when he comes off the breast  Your breasts should be softer when the baby is finished feeding  The baby should look relaxed at the end of feedings  After the 4th day and your milk is in:  The babys poop should turn bright yellow and be loose, watery, and seedy  The baby  should have at least 3-4 poops the size of the palm of your hand per day  The baby should have at least 6-8 wet diapers per day  The urine should be light yellow in color  You should drink when you are thirsty and eat a healthy diet when you are    hungry.     Take naps to get the rest you need.   Take medications and/or drink alcohol only with permission of your obstetrician    or the babys pediatrician.  You can also call the [facility],   ([phone]), Monday-Friday, 8am-5pm Central time, to get the most   up-to-date evidence-based information on the use of medications during   pregnancy and breastfeeding.      The baby should be examined by a pediatrician at 3-5 days of age; unless ordered sooner by the pediatrician.  Once your milk comes in, the baby should be back to birth weight no later than 10-14 days of age.    Primary Engorgement    If the milk is flowing, use wet or dry heat applied to the breasts for approximately 10min prior to each feeding as a comfort measure to facilitate the milk ejection reflex    Follow heat treatment with breast massage to soften hard/lumpy areas of the breast    Use unrestricted, frequent, effective feedings.      Wake baby to feed if necessary    Avoid pacifier and bottle feedings    Hand express or pump breasts to the point of comfort prn    Use cold treatments in the form of ice packs/gel packs/ frozen vegetables wrapped in a soft thin cloth and applied to the breasts for approximately 20min after each feeding until engorgement is resolved    Wear comfortable, supportive bra    Take pain medicine prn    Use anti-inflammatory medications if prescribed by physician    Other:     Pumping Instructions :    Preparation and Hygiene:    Shower daily.  Wear a clean bra each day and wash daily in warm soapy water.  Change wet or moist breast pads frequently.  Moist pads can promote growth of germs.  Actively wash your hands, paying close attention to the area around  and under your fingernails, thoroughly with soap and water for 15 seconds before pumping or handling your milk.  Re-wash your hands if you touch anything (scratching your nose, answering the phone, etc) while pumping or handling your milk.   Before pumping your breasts, assemble the pump collection kit and have ready the sterile container and labels.  Place these items on a clean surface next to the breastpump.  Each time after you have finished pumping, take apart all of the parts of the breastpump collection kit and place them in a separate cleaning container (do not place them in the sink).  Be sure to remove the yellow valve from the breastshield and separate the white membrane from the yellow valve.  Rinse all of these parts with cool water.  Then use a new sponge and/or bottle brush and dishwashing detergent to clean the parts.  Rinse off the soapy water with cool water and air dry on a clean towel covered with a clean cloth.  All parts may also be washed after each use in the top rack of a .  Once each day, sterilize all of the parts of the breastpump collection kit.  This can be done by boiling the kit parts for 10 minutes or by using a Quick Clean Micro-Steam Bag made by [employer]  If condensation appears in the tubing, continue to run the pump with the tubing attached for 1-2 minutes or until the tubing is dry.   Notify your babys nurse or doctor if you become ill or need to take any medication, even over-the-counter medicines.        Collection and Storage of Expressed Breastmilk:         1. Pump your breasts at least 8-10 times every 24 hours.  Double pump (both breasts at  the same time) for at least 15-20 minutes using the most suction that is comfortable.    2. Write the date and time of pumping and the name of any medications you are takingon the babys pre-printed hospital identification label.   3.    Do not touch the inside of the storage containers or lids.      4.        Tightly  screw the lid onto the container and place immediately into the                                refrigerator for daily use.  Bottle may remain at room temperature if the next                    feeding is within 4 hours.  5.    Expressed breastmilk should be refrigerated or frozen within 4 hours of                pumping.  6.        Do not store expressed breastmilk on the door of your refrigerator or freeze             where the temperature is warmer.   7.        Refrigerated milk may be stored in for up to 7 days.  At this point it can be              moved to the freezer for 6 -12 months.  8.        Thaw frozen breast milk in overnight in the refrigerator.  Once milk is thawed it              must be used within 24 hours.  9.        Refrigerated breast milk needs to be warmed to room temperature.  Warm by leaving unrefrigerated until it reached room temperature, or place sealed bottle into a cup of warm (not boiling) water or use a bottle warmer.               Never warm breast milk in a microwave or boiling water.    For any questions or concerns call:  Lactation Department at [phone]        After a Cesearean Birth    General Discharge Instructions  May follow a regular diet, unless otherwise discussed with physician.    Take showers, not baths unless otherwise discussed with physician.    Activity as tolerated.    No lifting or heavy exercise for 6 weeks, no driving for 2 weeks, no sexual   intercourse, douching or tampons for 6 weeks    May return to work/school as discussed with physician    Discuss birth control with physician    Take medications as directed    Breast care support bra worn at all times    Lactation consultant referral number ( [phone] or [phone])    Call Your Healthcare Provider Right Away If You Have:  A temperature of 100.4°F or higher.  If your blood pressure is over 155/105.  You have difficulty catching your breath or trouble breathing.  Heavy vaginal bleeding, clots, or  vaginal discharge with foul odor. (heavier than menses)  Persistent nausea or vomiting.  You gain more than 3 pounds in 3 days.  Severe headaches not relieved by Tylenol (acetaminophen) or Motrin (ibuprofen)  Blurry or double vision, see spots or flashing lights.  Dizziness or fainting.  New onset swelling or worsening of existing swelling.  Burning or pain when you urinate.  No bowel movement for 5 days.  Redness, warmth, swelling, or pain in the lower leg.  Redness, discharge, or pain worse than you had in the hospital.  Burning, pain, red streaks, or lumpy areas in your breasts.  Cracks, blisters, or blood on your nipples.  Feelings of extreme sadness or anxiety, or a feeling that you dont want to be with your baby.  If you have any new or unusual symptoms or have questions or concerns    Some of these symptoms can occur up to 4 to 6 weeks after delivery. This can be a sign of pre-eclampsia, which is a serious disease that can cause stroke, seizures, organ damage, or death. Do not wait to call your doctor or seek medical attention.          Incision Care  If you have an Aquacel dressing, then it stays in place for 1 week. It is waterproof and will be removed at your post-op visit. If it comes off before then, call your physician and keep the incision clean with warm soapy water and pat dry.  Watch your incision for signs of infection, such as increasing redness or drainage, or a foul smelling odor.  For ease of movement, hold a pillow against the incision when you get up from a lying or sitting position, and when you laugh or cough.  Avoid heavy lifting--nothing heavier than your baby until your doctor instructs you otherwise.     Follow-Up  Schedule a  follow-up exam with your healthcare provider for about 1 week after delivery. During this exam, your uterus and vaginal area will be checked. Contact your healthcare provider if you think you or your baby are having any problems.

## 2024-01-26 NOTE — ANESTHESIA PREPROCEDURE EVALUATION
01/26/2024  Michelle Burns is a 29 y.o., female.      Pre-op Assessment    I have reviewed the Patient Summary Reports.     I have reviewed the Nursing Notes. I have reviewed the NPO Status.   I have reviewed the Medications.     Review of Systems  Anesthesia Hx:  No problems with previous Anesthesia             Denies Family Hx of Anesthesia complications.    Denies Personal Hx of Anesthesia complications.                    Social:  No Alcohol Use, Non-Smoker       Hematology/Oncology:  Hematology Normal   Oncology Normal                                   EENT/Dental:  EENT/Dental Normal           Cardiovascular:  Cardiovascular Normal                                            Renal/:  Renal/ Normal                 Musculoskeletal:  Musculoskeletal Normal                Neurological:  Neurology Normal                                      Psych:  Psychiatric Normal                    Physical Exam  General: Well nourished, Cooperative, Oriented and Alert    Airway:  Mallampati: II   Mouth Opening: Normal  TM Distance: Normal  Tongue: Normal  Neck ROM: Normal ROM    Dental:  Intact        Anesthesia Plan  Type of Anesthesia, risks & benefits discussed:    Anesthesia Type: Spinal  Intra-op Monitoring Plan: Standard ASA Monitors  Post Op Pain Control Plan: intrathecal opioid  Informed Consent: Informed consent signed with the Patient and all parties understand the risks and agree with anesthesia plan.  All questions answered. Patient consented to blood products? Yes  ASA Score: 2    Ready For Surgery From Anesthesia Perspective.     .

## 2024-01-26 NOTE — HPI
Michelle Burns is a 29 y.o.  female with IUP at 39w0d weeks gestation who is admitted for scheduled  Section secondary to history of  section.     This IUP is complicated by history of  section.  Patient reports contractions, denies vaginal bleeding, denies LOF.   Fetal Movement: normal.

## 2024-01-26 NOTE — ANESTHESIA POSTPROCEDURE EVALUATION
Anesthesia Post Evaluation    Patient: Michelle Burns    Procedure(s) Performed: Procedure(s) (LRB):   SECTION (N/A)    Final Anesthesia Type: spinal      Patient location during evaluation: labor & delivery  Patient participation: Yes- Able to Participate  Level of consciousness: awake and alert  Post-procedure vital signs: reviewed and stable  Pain management: adequate  Airway patency: patent    PONV status at discharge: No PONV  Anesthetic complications: no      Cardiovascular status: hemodynamically stable  Respiratory status: room air, spontaneous ventilation and unassisted  Hydration status: euvolemic  Follow-up not needed.              Vitals Value Taken Time   BP 96/54 24 1449   Temp 36.5 °C (97.7 °F) 24 1253   Pulse 78 24 1459   Resp 18 24 1500   SpO2 99 % 24 1459   Vitals shown include unvalidated device data.      Event Time   Out of Recovery 15:00:00         Pain/Brian Score: Pain Rating Prior to Med Admin: 10 (2024  9:28 AM)

## 2024-01-27 LAB
BASOPHILS # BLD AUTO: 0.03 K/UL (ref 0–0.2)
BASOPHILS NFR BLD: 0.2 % (ref 0–1.9)
DIFFERENTIAL METHOD BLD: ABNORMAL
EOSINOPHIL # BLD AUTO: 0.1 K/UL (ref 0–0.5)
EOSINOPHIL NFR BLD: 0.8 % (ref 0–8)
ERYTHROCYTE [DISTWIDTH] IN BLOOD BY AUTOMATED COUNT: 15 % (ref 11.5–14.5)
HCT VFR BLD AUTO: 27.8 % (ref 37–48.5)
HGB BLD-MCNC: 8.4 G/DL (ref 12–16)
IMM GRANULOCYTES # BLD AUTO: 0.08 K/UL (ref 0–0.04)
IMM GRANULOCYTES NFR BLD AUTO: 0.6 % (ref 0–0.5)
LYMPHOCYTES # BLD AUTO: 1 K/UL (ref 1–4.8)
LYMPHOCYTES NFR BLD: 7.3 % (ref 18–48)
MCH RBC QN AUTO: 24.6 PG (ref 27–31)
MCHC RBC AUTO-ENTMCNC: 30.2 G/DL (ref 32–36)
MCV RBC AUTO: 81 FL (ref 82–98)
MONOCYTES # BLD AUTO: 1.3 K/UL (ref 0.3–1)
MONOCYTES NFR BLD: 9.4 % (ref 4–15)
NEUTROPHILS # BLD AUTO: 11.1 K/UL (ref 1.8–7.7)
NEUTROPHILS NFR BLD: 81.7 % (ref 38–73)
NRBC BLD-RTO: 0 /100 WBC
PLATELET # BLD AUTO: 150 K/UL (ref 150–450)
PMV BLD AUTO: 12.3 FL (ref 9.2–12.9)
RBC # BLD AUTO: 3.42 M/UL (ref 4–5.4)
RPR SER QL: NORMAL
WBC # BLD AUTO: 13.58 K/UL (ref 3.9–12.7)

## 2024-01-27 PROCEDURE — 63600175 PHARM REV CODE 636 W HCPCS: Performed by: OBSTETRICS & GYNECOLOGY

## 2024-01-27 PROCEDURE — 25000003 PHARM REV CODE 250: Performed by: ANESTHESIOLOGY

## 2024-01-27 PROCEDURE — 85025 COMPLETE CBC W/AUTO DIFF WBC: CPT | Performed by: OBSTETRICS & GYNECOLOGY

## 2024-01-27 PROCEDURE — 36415 COLL VENOUS BLD VENIPUNCTURE: CPT | Performed by: OBSTETRICS & GYNECOLOGY

## 2024-01-27 PROCEDURE — 11000001 HC ACUTE MED/SURG PRIVATE ROOM

## 2024-01-27 PROCEDURE — 25000003 PHARM REV CODE 250: Performed by: OBSTETRICS & GYNECOLOGY

## 2024-01-27 RX ORDER — DIPHENHYDRAMINE HCL 25 MG
25 CAPSULE ORAL EVERY 4 HOURS PRN
Status: DISCONTINUED | OUTPATIENT
Start: 2024-01-27 | End: 2024-01-28 | Stop reason: HOSPADM

## 2024-01-27 RX ORDER — HYDROCODONE BITARTRATE AND ACETAMINOPHEN 10; 325 MG/1; MG/1
1 TABLET ORAL EVERY 4 HOURS PRN
Status: DISCONTINUED | OUTPATIENT
Start: 2024-01-27 | End: 2024-01-28 | Stop reason: HOSPADM

## 2024-01-27 RX ORDER — PRENATAL WITH FERROUS FUM AND FOLIC ACID 3080; 920; 120; 400; 22; 1.84; 3; 20; 10; 1; 12; 200; 27; 25; 2 [IU]/1; [IU]/1; MG/1; [IU]/1; MG/1; MG/1; MG/1; MG/1; MG/1; MG/1; UG/1; MG/1; MG/1; MG/1; MG/1
1 TABLET ORAL DAILY
Status: DISCONTINUED | OUTPATIENT
Start: 2024-01-27 | End: 2024-01-28 | Stop reason: SDUPTHER

## 2024-01-27 RX ORDER — AMOXICILLIN 250 MG
1 CAPSULE ORAL NIGHTLY PRN
Status: DISCONTINUED | OUTPATIENT
Start: 2024-01-27 | End: 2024-01-28 | Stop reason: HOSPADM

## 2024-01-27 RX ORDER — BISACODYL 10 MG/1
10 SUPPOSITORY RECTAL ONCE AS NEEDED
Status: DISCONTINUED | OUTPATIENT
Start: 2024-01-27 | End: 2024-01-28 | Stop reason: HOSPADM

## 2024-01-27 RX ORDER — SIMETHICONE 80 MG
1 TABLET,CHEWABLE ORAL EVERY 6 HOURS PRN
Status: DISCONTINUED | OUTPATIENT
Start: 2024-01-27 | End: 2024-01-28 | Stop reason: HOSPADM

## 2024-01-27 RX ORDER — HYDROCODONE BITARTRATE AND ACETAMINOPHEN 5; 325 MG/1; MG/1
1 TABLET ORAL EVERY 4 HOURS PRN
Status: DISCONTINUED | OUTPATIENT
Start: 2024-01-27 | End: 2024-01-28 | Stop reason: HOSPADM

## 2024-01-27 RX ORDER — OXYTOCIN/RINGER'S LACTATE 30/500 ML
95 PLASTIC BAG, INJECTION (ML) INTRAVENOUS ONCE
Status: DISCONTINUED | OUTPATIENT
Start: 2024-01-27 | End: 2024-01-28 | Stop reason: HOSPADM

## 2024-01-27 RX ORDER — IBUPROFEN 600 MG/1
600 TABLET ORAL EVERY 6 HOURS
Status: DISCONTINUED | OUTPATIENT
Start: 2024-01-27 | End: 2024-01-28 | Stop reason: HOSPADM

## 2024-01-27 RX ORDER — ADHESIVE BANDAGE
30 BANDAGE TOPICAL 2 TIMES DAILY PRN
Status: DISCONTINUED | OUTPATIENT
Start: 2024-01-28 | End: 2024-01-28 | Stop reason: HOSPADM

## 2024-01-27 RX ORDER — HYDROCORTISONE 25 MG/G
CREAM TOPICAL 3 TIMES DAILY PRN
Status: DISCONTINUED | OUTPATIENT
Start: 2024-01-27 | End: 2024-01-28 | Stop reason: HOSPADM

## 2024-01-27 RX ORDER — DOCUSATE SODIUM 100 MG/1
200 CAPSULE, LIQUID FILLED ORAL 2 TIMES DAILY
Status: DISCONTINUED | OUTPATIENT
Start: 2024-01-27 | End: 2024-01-27

## 2024-01-27 RX ADMIN — IBUPROFEN 600 MG: 600 TABLET ORAL at 05:01

## 2024-01-27 RX ADMIN — HYDROCODONE BITARTRATE AND ACETAMINOPHEN 1 TABLET: 5; 325 TABLET ORAL at 06:01

## 2024-01-27 RX ADMIN — KETOROLAC TROMETHAMINE 30 MG: 30 INJECTION, SOLUTION INTRAMUSCULAR; INTRAVENOUS at 11:01

## 2024-01-27 RX ADMIN — ACETAMINOPHEN 650 MG: 325 TABLET ORAL at 06:01

## 2024-01-27 RX ADMIN — KETOROLAC TROMETHAMINE 30 MG: 30 INJECTION, SOLUTION INTRAMUSCULAR; INTRAVENOUS at 12:01

## 2024-01-27 RX ADMIN — ACETAMINOPHEN 650 MG: 325 TABLET ORAL at 12:01

## 2024-01-27 RX ADMIN — PRENATAL VIT W/ FE FUMARATE-FA TAB 27-0.8 MG 1 TABLET: 27-0.8 TAB at 10:01

## 2024-01-27 RX ADMIN — ACETAMINOPHEN 650 MG: 325 TABLET ORAL at 11:01

## 2024-01-27 RX ADMIN — MUPIROCIN: 20 OINTMENT TOPICAL at 08:01

## 2024-01-27 RX ADMIN — DOCUSATE SODIUM 200 MG: 100 CAPSULE, LIQUID FILLED ORAL at 08:01

## 2024-01-27 RX ADMIN — DOCUSATE SODIUM 200 MG: 100 CAPSULE, LIQUID FILLED ORAL at 09:01

## 2024-01-27 RX ADMIN — MUPIROCIN: 20 OINTMENT TOPICAL at 09:01

## 2024-01-27 RX ADMIN — KETOROLAC TROMETHAMINE 30 MG: 30 INJECTION, SOLUTION INTRAMUSCULAR; INTRAVENOUS at 06:01

## 2024-01-27 NOTE — L&D DELIVERY NOTE
[facility]   Section   Operative Note    SUMMARY     Date of Procedure: 2024     Procedure: Procedure(s) (LRB):   SECTION (N/A)    Surgeon(s) and Role:     * Aidee Scanlon MD - Primary     * Amarilys Raygoza MD - Assisting    Pre-Operative Diagnosis:   History of  Section    Post-Operative Diagnosis:   Same    Anesthesia: Spinal/Epidural    Technical Procedures Used:   Repeat Low Transverse  Section  Vacuum Assisted           Description of the Findings of the Procedure:   Normal female infant in cephalic Presentation    Complications: No    Blood Loss: 288 cc       With patient in supine position, the legs are  and Olsen Catheter placed and positioning to supine done.   Abdomen prepped with Chloroprep and 3 minute drying time allowed prior to draping of the abdomen.   Time out taken with OR team members.  Pfannenstiel Incision made through the skin, transverse fascial incision developed, rectus muscles  in the midline and the peritoneum entered.   no adhesions noted.  The lower uterine segment and position of the fetus identified.   Bladder flap taken down through transverse peritoneal incision.    Low Transverse Incision made through well developer lower uterine segment and extended laterally with blunt dissection.   Clear fluid noted.  Infant delivered from vertex presentation.  Cord clamped after one minute and  handed to attending nurse.  Cord blood taken, placenta delivered.  The uterus was exteriorized.  The edges of the uterine incision are grasped with Bess clamps at the angles and the inferior and superior midline edges of the incision.    Closure with running lock 0 Monocryl, starting at the left angle and tying on the right.  A second 0 Monocryl was used for an imbricating layer over the hysterotomy.  Observation for bleeding with suture of any bleeding along the hysterotomy line.   With good hemostasis noted,  "the anterior pelvis is rinsed with sterile saline.   Right and left adnexa with normal anatomy.     Closure of the abdomen with 2 0 Vicryl running of the peritoneum and rectus muscles, fascial closure with 0 Vicryl starting at the each angle and tying the knot in the midline angle.  3-0 plain gut was used to reapproximate the subcutaneous tissue.  Skin closure with 4 0 Monocryl subcuticular.  Wound dressed with Aquacel.          Specimens:   Specimen (24h ago, onward)      None            Condition: Good    VTE Risk Mitigation (From admission, onward)           Ordered     IP VTE HIGH RISK PATIENT  Once         24     Place sequential compression device  Until discontinued         24                    Disposition: PACU - hemodynamically stable.    Attestation: Good       Delivery Information for Jenelle Burns    Birth information:  YOB: 2023   Time of birth: 12:09 PM   Sex: female   Head Delivery Date/Time: 2024 12:09 PM   Delivery type: , Low Transverse   Gestational Age: 39w0d        Delivery Providers    Delivering clinician: Aidee Scanlon MD   Provider Role    Ynes Brown RN Delivery Nurse    Maxwell Bell CRNA Nurse Anesthetist    Zulema Vazquez Scrub Person    Adelina Salazar RN Registered Nurse    Gladis Ogden, NNP Nurse Practitioner    Amarilys Raygoza MD Assisting Surgeon    Augustin Bruno MD Anesthesiologist              Measurements    Weight: 3210 g  Weight (lbs): 7 lb 1.2 oz  Length: 48.9 cm  Length (in): 19.25"         Apgars    Living status: Living  Apgar Component Scores:  1 min.:  5 min.:  10 min.:  15 min.:  20 min.:    Skin color:  1  1       Heart rate:  2  2       Reflex irritability:  2  2       Muscle tone:  2  2       Respiratory effort:  2  2       Total:  9  9       Apgars assigned by: ADELINA SALAZAR RN         Operative Delivery    Forceps attempted?: No  Vacuum extractor attempted?: Yes  Vacuum type: Kiwi  First " attempt time vacuum applied: 2024 12:08:00  First attempt time vacuum removed: 2024 12:08:00  Second attempt time vacuum applied: 2024 12:09:00  Second attempt time vacuum removed: 2024 12:09:00  Number of pop offs: 1  Number of pulls with vacuum: 2  Total vacuum application time: 15 seconds  Vacuum applied by: #1 DR. ARREGUIN #2 DR. HALE  Failed vacuum delivery?: No         Shoulder Dystocia    Shoulder dystocia present?: No           Presentation    Presentation: Vertex  Position: Right Occiput Transverse           Interventions/Resuscitation    Method: Bulb Suctioning, Tactile Stimulation, NICU Attended       Cord    Vessels: 3 vessels  Complications: Nuchal  Nuchal Intervention: reduced  Nuchal Cord Description: loose nuchal cord  Number of Loops: 1  Delayed Cord Clamping?: No  Cord Clamped Date/Time: 2024 12:09 PM  Cord Blood Disposition: Sent with Baby  Gases Sent?: No  Stem Cell Collection (by MD): No       Placenta    Placenta delivery date/time: 2024 1210  Placenta removal: Manual removal  Placenta appearance: Intact  Placenta disposition: Discarded           Labor Events:       labor: No     Labor Onset Date/Time:         Dilation Complete Date/Time:         Start Pushing Date/Time:         Start Pushing Date/Time:       Rupture Date/Time:            Rupture type: INT (Intact)         Fluid Amount:       Fluid Color:                steroids: None     Antibiotics given for GBS: No     Induction: none     Indications for induction:        Augmentation:       Indications for augmentation:       Labor complications: None     Additional complications:          Cervical ripening:                     Delivery:      Episiotomy: None     Indication for Episiotomy:       Perineal Lacerations: None Repaired:      Periurethral Laceration:   Repaired:     Labial Laceration:   Repaired:     Sulcus Laceration:   Repaired:     Vaginal Laceration:   Repaired:      Cervical Laceration:   Repaired:     Repair suture: None     Repair # of packets:       Last Value - EBL - Nursing (mL):       Sum - EBL - Nursing (mL): 0     Last Value - EBL - Anesthesia (mL):      Calculated QBL (mL): 288      Vaginal Sweep Performed:       Surgicount Correct:       Vaginal Packing:   Quantity:       Other providers:       Anesthesia    Method: Spinal          Details (if applicable):  Trial of Labor No    Categorization: Repeat    Priority: Routine   Indications for : Repeat Section   Incision Type: low transverse     Additional  information:  Forceps:    Vacuum:    Breech:    Observed anomalies    Other (Comments):

## 2024-01-27 NOTE — ASSESSMENT & PLAN NOTE
- Patient doing well. Continue routine management and advances.  - Continue PO pain meds. Pain well controlled.  - Pre H/H 9.1/29.9 --> post H/H 8.4/27.88; VSS, asymptomatic  - Encourage ambulation  - Contraception: will discuss at postpartum visit  - Lactation breast feeding

## 2024-01-27 NOTE — PLAN OF CARE
Plan of care reviewed. Fundus firm. Bonding with infant. Frequent checks made to ensure safety and comfort. Bed low, call bell within reach.

## 2024-01-27 NOTE — PLAN OF CARE
Problem: Adult Inpatient Plan of Care  Goal: Plan of Care Review  Outcome: Ongoing, Progressing  Goal: Patient-Specific Goal (Individualized)  Outcome: Ongoing, Progressing  Flowsheets (Taken 2024 4378)  Anxieties, Fears or Concerns: None expressed  Individualized Care Needs: Patient using lanolin product from home. States her son breast fed until almost 4 y.o. and was she had to deal with some nipple discomfort then too. Feels confident in breastfeeding capabilities. Encouraged to ambulate on unit and wear abdominal binder during walks to decrease discomfort.  assisting with infant care.   Goal: Absence of Hospital-Acquired Illness or Injury  Outcome: Ongoing, Progressing  Goal: Optimal Comfort and Wellbeing  Outcome: Ongoing, Progressing  Goal: Readiness for Transition of Care  Outcome: Ongoing, Progressing     Problem:  Fall Injury Risk  Goal: Absence of Fall, Infant Drop and Related Injury  Outcome: Ongoing, Progressing     Problem: Breastfeeding  Goal: Effective Breastfeeding  Outcome: Ongoing, Progressing     Problem: Adjustment to Role Transition (Postpartum  Delivery)  Goal: Successful Maternal Role Transition  Outcome: Ongoing, Progressing     Problem: Bleeding (Postpartum  Delivery)  Goal: Hemostasis  Outcome: Ongoing, Progressing     Problem: Infection (Postpartum  Delivery)  Goal: Absence of Infection Signs and Symptoms  Outcome: Ongoing, Progressing     Problem: Pain (Postpartum  Delivery)  Goal: Acceptable Pain Control  Outcome: Ongoing, Progressing     Problem: Postoperative Urinary Retention (Postpartum  Delivery)  Goal: Effective Urinary Elimination  Outcome: Ongoing, Progressing

## 2024-01-27 NOTE — HOSPITAL COURSE
01/27/2024: POD#1 s/p RLTCS. She is ambulating, voiding, and tolerating PO. She reports that her pain is controlled. She denies heavy lochia. She is breast feeding her female infant.   01/26/2024: POD#2 s/p RLTCS. She is ambulating, voiding, and tolerating PO. She reports that her pain is controlled. She denies heavy lochia. She is breast feeding her female infant.

## 2024-01-27 NOTE — SUBJECTIVE & OBJECTIVE
Interval History:   She is doing well this morning. She is tolerating a regular diet without nausea or vomiting. She is voiding spontaneously. She is ambulating. She has passed flatus, and has not a BM. Vaginal bleeding is mild. She denies fever or chills. Abdominal pain is moderate and controlled with oral medications. She Is breastfeeding. She desires circumcision for her male baby: not applicable.    Objective:     Vital Signs (Most Recent):  Temp: 97.5 °F (36.4 °C) (01/27/24 0716)  Pulse: 62 (01/27/24 0716)  Resp: 18 (01/27/24 0716)  BP: (!) 109/55 (01/27/24 0716)  SpO2: 99 % (01/27/24 0716) Vital Signs (24h Range):  Temp:  [97.5 °F (36.4 °C)-98.2 °F (36.8 °C)] 97.5 °F (36.4 °C)  Pulse:  [] 62  Resp:  [18-20] 18  SpO2:  [96 %-100 %] 99 %  BP: ()/(51-69) 109/55     Weight: 86.2 kg (190 lb)  Body mass index is 30.67 kg/m².      Intake/Output Summary (Last 24 hours) at 1/27/2024 1100  Last data filed at 1/27/2024 0950  Gross per 24 hour   Intake --   Output 2438 ml   Net -2438 ml         Significant Labs:  Lab Results   Component Value Date    GROUPTRH B POS 01/26/2024    HEPBSAG Non-reactive 01/18/2024    STREPBCULT No Group B Streptococcus isolated 01/11/2024     Recent Labs   Lab 01/27/24 0721   HGB 8.4*   HCT 27.8*       CBC:   Recent Labs   Lab 01/27/24 0721   WBC 13.58*   RBC 3.42*   HGB 8.4*   HCT 27.8*      MCV 81*   MCH 24.6*   MCHC 30.2*     I have personallly reviewed all pertinent lab results from the last 24 hours.    Physical Exam:   Constitutional: She is oriented to person, place, and time. She appears well-developed and well-nourished. No distress.       Cardiovascular:  Normal rate.             Pulmonary/Chest: Effort normal.        Abdominal: Soft. She exhibits abdominal incision. She exhibits no distension.                 Neurological: She is alert and oriented to person, place, and time.    Skin: Skin is warm and dry.    Psychiatric: She has a normal mood and affect.        Review of Systems   Constitutional:  Negative for chills and fever.   Eyes:  Negative for visual disturbance.   Respiratory:  Negative for cough and wheezing.    Cardiovascular:  Negative for chest pain and palpitations.   Gastrointestinal:  Positive for abdominal pain. Negative for nausea and vomiting.   Genitourinary:  Negative for dysuria, frequency, hematuria, pelvic pain, vaginal bleeding, vaginal discharge and vaginal pain.   Neurological:  Negative for headaches.   Psychiatric/Behavioral:  Negative for depression.

## 2024-01-27 NOTE — PROGRESS NOTES
[facility]  Obstetrics  Postpartum Progress Note    Patient Name: Michelle Burns  MRN: [id number]  Admission Date: 2024  Hospital Length of Stay: 1 days  Attending Physician: Aidee Scanlon, *  Primary Care Provider: Iliana, Primary Doctor    Subjective:     Principal Problem:Status post primary low transverse  section    Hospital Course:  2024: POD#1 s/p RLTCS. She is ambulating, voiding, and tolerating PO. She reports that her pain is controlled. She denies heavy lochia. She is breast feeding her female infant.     Interval History:   She is doing well this morning. She is tolerating a regular diet without nausea or vomiting. She is voiding spontaneously. She is ambulating. She has passed flatus, and has not a BM. Vaginal bleeding is mild. She denies fever or chills. Abdominal pain is moderate and controlled with oral medications. She Is breastfeeding. She desires circumcision for her male baby: not applicable.    Objective:     Vital Signs (Most Recent):  Temp: 97.5 °F (36.4 °C) (24)  Pulse: 62 (24)  Resp: 18 (24)  BP: (!) 109/55 (24)  SpO2: 99 % (24) Vital Signs (24h Range):  Temp:  [97.5 °F (36.4 °C)-98.2 °F (36.8 °C)] 97.5 °F (36.4 °C)  Pulse:  [] 62  Resp:  [18-20] 18  SpO2:  [96 %-100 %] 99 %  BP: ()/(51-69) 109/55     Weight: 86.2 kg (190 lb)  Body mass index is 30.67 kg/m².      Intake/Output Summary (Last 24 hours) at 2024 1100  Last data filed at 2024 0950  Gross per 24 hour   Intake --   Output 2438 ml   Net -2438 ml         Significant Labs:  Lab Results   Component Value Date    GROUPTRH B POS 2024    HEPBSAG Non-reactive 2024    STREPBCULT No Group B Streptococcus isolated 2024     Recent Labs   Lab 24  0721   HGB 8.4*   HCT 27.8*       CBC:   Recent Labs   Lab 24  0721   WBC 13.58*   RBC 3.42*   HGB 8.4*   HCT 27.8*      MCV 81*   MCH 24.6*   MCHC  30.2*     I have personallly reviewed all pertinent lab results from the last 24 hours.    Physical Exam:   Constitutional: She is oriented to person, place, and time. She appears well-developed and well-nourished. No distress.       Cardiovascular:  Normal rate.             Pulmonary/Chest: Effort normal.        Abdominal: Soft. She exhibits abdominal incision. She exhibits no distension.                 Neurological: She is alert and oriented to person, place, and time.    Skin: Skin is warm and dry.    Psychiatric: She has a normal mood and affect.       Review of Systems   Constitutional:  Negative for chills and fever.   Eyes:  Negative for visual disturbance.   Respiratory:  Negative for cough and wheezing.    Cardiovascular:  Negative for chest pain and palpitations.   Gastrointestinal:  Positive for abdominal pain. Negative for nausea and vomiting.   Genitourinary:  Negative for dysuria, frequency, hematuria, pelvic pain, vaginal bleeding, vaginal discharge and vaginal pain.   Neurological:  Negative for headaches.   Psychiatric/Behavioral:  Negative for depression.      Assessment/Plan:     29 y.o. female  for:    * Status post primary low transverse  section  - Patient doing well. Continue routine management and advances.  - Continue PO pain meds. Pain well controlled.  - Pre H/H 9.1/29.9 --> post H/H 8.4/27.88; VSS, asymptomatic  - Encourage ambulation  - Contraception: will discuss at postpartum visit  - Lactation breast feeding      Disposition: As patient meets milestones, will plan to discharge POD#2-3.    Aidee Scanlon MD  Obstetrics  [facility] [place] [facility]

## 2024-01-28 VITALS
RESPIRATION RATE: 20 BRPM | WEIGHT: 190 LBS | TEMPERATURE: 98 F | BODY MASS INDEX: 30.53 KG/M2 | OXYGEN SATURATION: 100 % | HEIGHT: 66 IN | DIASTOLIC BLOOD PRESSURE: 56 MMHG | SYSTOLIC BLOOD PRESSURE: 129 MMHG | HEART RATE: 104 BPM

## 2024-01-28 PROBLEM — Z98.891 HISTORY OF CESAREAN SECTION: Status: RESOLVED | Noted: 2023-08-10 | Resolved: 2024-01-28

## 2024-01-28 LAB
BASOPHILS # BLD AUTO: 0.02 K/UL (ref 0–0.2)
BASOPHILS NFR BLD: 0.3 % (ref 0–1.9)
DIFFERENTIAL METHOD BLD: ABNORMAL
EOSINOPHIL # BLD AUTO: 0.2 K/UL (ref 0–0.5)
EOSINOPHIL NFR BLD: 2 % (ref 0–8)
ERYTHROCYTE [DISTWIDTH] IN BLOOD BY AUTOMATED COUNT: 15.3 % (ref 11.5–14.5)
HCT VFR BLD AUTO: 26.5 % (ref 37–48.5)
HGB BLD-MCNC: 7.8 G/DL (ref 12–16)
IMM GRANULOCYTES # BLD AUTO: 0.05 K/UL (ref 0–0.04)
IMM GRANULOCYTES NFR BLD AUTO: 0.7 % (ref 0–0.5)
LYMPHOCYTES # BLD AUTO: 1.5 K/UL (ref 1–4.8)
LYMPHOCYTES NFR BLD: 20.4 % (ref 18–48)
MCH RBC QN AUTO: 24.1 PG (ref 27–31)
MCHC RBC AUTO-ENTMCNC: 29.4 G/DL (ref 32–36)
MCV RBC AUTO: 82 FL (ref 82–98)
MONOCYTES # BLD AUTO: 1 K/UL (ref 0.3–1)
MONOCYTES NFR BLD: 12.6 % (ref 4–15)
NEUTROPHILS # BLD AUTO: 4.8 K/UL (ref 1.8–7.7)
NEUTROPHILS NFR BLD: 64 % (ref 38–73)
NRBC BLD-RTO: 0 /100 WBC
PLATELET # BLD AUTO: 140 K/UL (ref 150–450)
PMV BLD AUTO: 12.4 FL (ref 9.2–12.9)
RBC # BLD AUTO: 3.24 M/UL (ref 4–5.4)
WBC # BLD AUTO: 7.51 K/UL (ref 3.9–12.7)

## 2024-01-28 PROCEDURE — 25000003 PHARM REV CODE 250: Performed by: OBSTETRICS & GYNECOLOGY

## 2024-01-28 PROCEDURE — 90686 IIV4 VACC NO PRSV 0.5 ML IM: CPT | Performed by: OBSTETRICS & GYNECOLOGY

## 2024-01-28 PROCEDURE — 36415 COLL VENOUS BLD VENIPUNCTURE: CPT | Performed by: OBSTETRICS & GYNECOLOGY

## 2024-01-28 PROCEDURE — 63600175 PHARM REV CODE 636 W HCPCS: Performed by: OBSTETRICS & GYNECOLOGY

## 2024-01-28 PROCEDURE — 90471 IMMUNIZATION ADMIN: CPT | Performed by: OBSTETRICS & GYNECOLOGY

## 2024-01-28 PROCEDURE — 3E02340 INTRODUCTION OF INFLUENZA VACCINE INTO MUSCLE, PERCUTANEOUS APPROACH: ICD-10-PCS | Performed by: OBSTETRICS & GYNECOLOGY

## 2024-01-28 PROCEDURE — 85025 COMPLETE CBC W/AUTO DIFF WBC: CPT | Performed by: OBSTETRICS & GYNECOLOGY

## 2024-01-28 RX ORDER — SODIUM CHLORIDE 0.9 % (FLUSH) 0.9 %
10 SYRINGE (ML) INJECTION
Status: DISCONTINUED | OUTPATIENT
Start: 2024-01-28 | End: 2024-01-28 | Stop reason: HOSPADM

## 2024-01-28 RX ORDER — METHYLERGONOVINE MALEATE 0.2 MG/ML
200 INJECTION INTRAVENOUS ONCE AS NEEDED
Status: DISCONTINUED | OUTPATIENT
Start: 2024-01-28 | End: 2024-01-28 | Stop reason: HOSPADM

## 2024-01-28 RX ORDER — MISOPROSTOL 200 UG/1
800 TABLET ORAL ONCE AS NEEDED
Status: DISCONTINUED | OUTPATIENT
Start: 2024-01-28 | End: 2024-01-28 | Stop reason: HOSPADM

## 2024-01-28 RX ORDER — CARBOPROST TROMETHAMINE 250 UG/ML
250 INJECTION, SOLUTION INTRAMUSCULAR
Status: DISCONTINUED | OUTPATIENT
Start: 2024-01-28 | End: 2024-01-28 | Stop reason: HOSPADM

## 2024-01-28 RX ORDER — MUPIROCIN 20 MG/G
OINTMENT TOPICAL 2 TIMES DAILY
Status: DISCONTINUED | OUTPATIENT
Start: 2024-01-28 | End: 2024-01-28 | Stop reason: HOSPADM

## 2024-01-28 RX ORDER — OXYTOCIN/RINGER'S LACTATE 30/500 ML
95 PLASTIC BAG, INJECTION (ML) INTRAVENOUS ONCE AS NEEDED
Status: DISCONTINUED | OUTPATIENT
Start: 2024-01-28 | End: 2024-01-28 | Stop reason: HOSPADM

## 2024-01-28 RX ORDER — OXYTOCIN 10 [USP'U]/ML
10 INJECTION, SOLUTION INTRAMUSCULAR; INTRAVENOUS ONCE AS NEEDED
Status: DISCONTINUED | OUTPATIENT
Start: 2024-01-28 | End: 2024-01-28 | Stop reason: HOSPADM

## 2024-01-28 RX ORDER — ONDANSETRON 8 MG/1
8 TABLET, ORALLY DISINTEGRATING ORAL EVERY 8 HOURS PRN
Status: DISCONTINUED | OUTPATIENT
Start: 2024-01-28 | End: 2024-01-28 | Stop reason: HOSPADM

## 2024-01-28 RX ORDER — PROCHLORPERAZINE EDISYLATE 5 MG/ML
5 INJECTION INTRAMUSCULAR; INTRAVENOUS EVERY 6 HOURS PRN
Status: DISCONTINUED | OUTPATIENT
Start: 2024-01-28 | End: 2024-01-28 | Stop reason: HOSPADM

## 2024-01-28 RX ORDER — OXYTOCIN/RINGER'S LACTATE 30/500 ML
334 PLASTIC BAG, INJECTION (ML) INTRAVENOUS ONCE AS NEEDED
Status: DISCONTINUED | OUTPATIENT
Start: 2024-01-28 | End: 2024-01-28 | Stop reason: HOSPADM

## 2024-01-28 RX ORDER — DIPHENOXYLATE HYDROCHLORIDE AND ATROPINE SULFATE 2.5; .025 MG/1; MG/1
2 TABLET ORAL EVERY 6 HOURS PRN
Status: DISCONTINUED | OUTPATIENT
Start: 2024-01-28 | End: 2024-01-28 | Stop reason: HOSPADM

## 2024-01-28 RX ORDER — PRENATAL WITH FERROUS FUM AND FOLIC ACID 3080; 920; 120; 400; 22; 1.84; 3; 20; 10; 1; 12; 200; 27; 25; 2 [IU]/1; [IU]/1; MG/1; [IU]/1; MG/1; MG/1; MG/1; MG/1; MG/1; MG/1; UG/1; MG/1; MG/1; MG/1; MG/1
1 TABLET ORAL DAILY
Status: DISCONTINUED | OUTPATIENT
Start: 2024-01-28 | End: 2024-01-28 | Stop reason: HOSPADM

## 2024-01-28 RX ORDER — TRANEXAMIC ACID 10 MG/ML
1000 INJECTION, SOLUTION INTRAVENOUS EVERY 30 MIN PRN
Status: DISCONTINUED | OUTPATIENT
Start: 2024-01-28 | End: 2024-01-28 | Stop reason: HOSPADM

## 2024-01-28 RX ADMIN — IBUPROFEN 600 MG: 600 TABLET ORAL at 05:01

## 2024-01-28 RX ADMIN — IBUPROFEN 600 MG: 600 TABLET ORAL at 12:01

## 2024-01-28 RX ADMIN — PRENATAL VIT W/ FE FUMARATE-FA TAB 27-0.8 MG 1 TABLET: 27-0.8 TAB at 09:01

## 2024-01-28 RX ADMIN — MUPIROCIN: 20 OINTMENT TOPICAL at 09:01

## 2024-01-28 RX ADMIN — INFLUENZA VIRUS VACCINE 0.5 ML: 15; 15; 15; 15 SUSPENSION INTRAMUSCULAR at 08:01

## 2024-01-28 NOTE — PROGRESS NOTES
[facility]  Obstetrics  Postpartum Progress Note    Patient Name: Michelle Burns  MRN: [id number]  Admission Date: 2024  Hospital Length of Stay: 2 days  Attending Physician: Aidee Scanlon, *  Primary Care Provider: Iliana, Primary Doctor    Subjective:     Principal Problem:Status post primary low transverse  section    Hospital Course:  2024: POD#1 s/p RLTCS. She is ambulating, voiding, and tolerating PO. She reports that her pain is controlled. She denies heavy lochia. She is breast feeding her female infant.   2024: POD#2 s/p RLTCS. She is ambulating, voiding, and tolerating PO. She reports that her pain is controlled. She denies heavy lochia. She is breast feeding her female infant.     Interval History:   She is doing well this morning. She is tolerating a regular diet without nausea or vomiting. She is voiding spontaneously. She is ambulating. She has passed flatus, and has not a BM. Vaginal bleeding is mild. She denies fever or chills. Abdominal pain is moderate and controlled with oral medications. She Is breastfeeding. She desires circumcision for her male baby: not applicable.    Objective:     Vital Signs (Most Recent):  Temp: 99 °F (37.2 °C) (24)  Pulse: 89 (24)  Resp: 18 (24)  BP: (!) 107/53 (24)  SpO2: 97 % (24) Vital Signs (24h Range):  Temp:  [98.2 °F (36.8 °C)-99 °F (37.2 °C)] 99 °F (37.2 °C)  Pulse:  [80-93] 89  Resp:  [18-20] 18  SpO2:  [97 %-98 %] 97 %  BP: (101-111)/(50-55) 107/53     Weight: 86.2 kg (190 lb)  Body mass index is 30.67 kg/m².      Intake/Output Summary (Last 24 hours) at 2024 0725  Last data filed at 2024 1350  Gross per 24 hour   Intake --   Output 1100 ml   Net -1100 ml         Significant Labs:  Lab Results   Component Value Date    GROUPTRH B POS 2024    HEPBSAG Non-reactive 2024    STREPBCULT No Group B Streptococcus isolated 2024     Recent Labs    Lab 24  0554   HGB 7.8*   HCT 26.5*       CBC:   Recent Labs   Lab 24  0554   WBC 7.51   RBC 3.24*   HGB 7.8*   HCT 26.5*   *   MCV 82   MCH 24.1*   MCHC 29.4*     I have personallly reviewed all pertinent lab results from the last 24 hours.    Physical Exam:   Constitutional: She is oriented to person, place, and time. She appears well-developed and well-nourished. No distress.       Cardiovascular:  Normal rate.             Pulmonary/Chest: Effort normal.        Abdominal: Soft. She exhibits abdominal incision. She exhibits no distension.                 Neurological: She is alert and oriented to person, place, and time.    Skin: Skin is warm and dry.    Psychiatric: She has a normal mood and affect.       Review of Systems   Constitutional:  Negative for chills and fever.   Eyes:  Negative for visual disturbance.   Respiratory:  Negative for cough and wheezing.    Cardiovascular:  Negative for chest pain and palpitations.   Gastrointestinal:  Negative for abdominal pain, nausea and vomiting.   Genitourinary:  Negative for dysuria, frequency, hematuria, pelvic pain, vaginal bleeding, vaginal discharge and vaginal pain.   Neurological:  Negative for headaches.   Psychiatric/Behavioral:  Negative for depression.      Assessment/Plan:     29 y.o. female  for:    * Status post primary low transverse  section  - Patient doing well. Continue routine management and advances.  - Continue PO pain meds. Pain well controlled.  - Pre H/H 9.1/29.9 --> post H/H 8.4/27.88; VSS, asymptomatic  - Encourage ambulation  - Contraception: will discuss at postpartum visit  - Lactation breast feeding          Disposition: As patient meets milestones, will plan to discharge today per patient's request.    Aidee Scanlon MD  Obstetrics  [facility]

## 2024-01-28 NOTE — PLAN OF CARE
Problem: Adult Inpatient Plan of Care  Goal: Plan of Care Review  Outcome: Ongoing, Progressing  Goal: Patient-Specific Goal (Individualized)  Outcome: Ongoing, Progressing  Goal: Absence of Hospital-Acquired Illness or Injury  Outcome: Ongoing, Progressing  Goal: Optimal Comfort and Wellbeing  Outcome: Ongoing, Progressing  Goal: Readiness for Transition of Care  Outcome: Ongoing, Progressing     Problem:  Fall Injury Risk  Goal: Absence of Fall, Infant Drop and Related Injury  Outcome: Ongoing, Progressing     Problem: Infection  Goal: Absence of Infection Signs and Symptoms  Outcome: Ongoing, Progressing     Problem: Breastfeeding  Goal: Effective Breastfeeding  Outcome: Ongoing, Progressing     Problem: Adjustment to Role Transition (Postpartum  Delivery)  Goal: Successful Maternal Role Transition  Outcome: Ongoing, Progressing     Problem: Bleeding (Postpartum  Delivery)  Goal: Hemostasis  Outcome: Ongoing, Progressing     Problem: Infection (Postpartum  Delivery)  Goal: Absence of Infection Signs and Symptoms  Outcome: Ongoing, Progressing     Problem: Pain (Postpartum  Delivery)  Goal: Acceptable Pain Control  Outcome: Ongoing, Progressing

## 2024-01-28 NOTE — SUBJECTIVE & OBJECTIVE
Interval History:   She is doing well this morning. She is tolerating a regular diet without nausea or vomiting. She is voiding spontaneously. She is ambulating. She has passed flatus, and has not a BM. Vaginal bleeding is mild. She denies fever or chills. Abdominal pain is moderate and controlled with oral medications. She Is breastfeeding. She desires circumcision for her male baby: not applicable.    Objective:     Vital Signs (Most Recent):  Temp: 99 °F (37.2 °C) (01/28/24 0721)  Pulse: 89 (01/28/24 0721)  Resp: 18 (01/28/24 0721)  BP: (!) 107/53 (01/28/24 0721)  SpO2: 97 % (01/28/24 0721) Vital Signs (24h Range):  Temp:  [98.2 °F (36.8 °C)-99 °F (37.2 °C)] 99 °F (37.2 °C)  Pulse:  [80-93] 89  Resp:  [18-20] 18  SpO2:  [97 %-98 %] 97 %  BP: (101-111)/(50-55) 107/53     Weight: 86.2 kg (190 lb)  Body mass index is 30.67 kg/m².      Intake/Output Summary (Last 24 hours) at 1/28/2024 0741  Last data filed at 1/27/2024 1350  Gross per 24 hour   Intake --   Output 1100 ml   Net -1100 ml         Significant Labs:  Lab Results   Component Value Date    GROUPTRH B POS 01/26/2024    HEPBSAG Non-reactive 01/18/2024    STREPBCULT No Group B Streptococcus isolated 01/11/2024     Recent Labs   Lab 01/28/24  0554   HGB 7.8*   HCT 26.5*       CBC:   Recent Labs   Lab 01/28/24  0554   WBC 7.51   RBC 3.24*   HGB 7.8*   HCT 26.5*   *   MCV 82   MCH 24.1*   MCHC 29.4*     I have personallly reviewed all pertinent lab results from the last 24 hours.    Physical Exam:   Constitutional: She is oriented to person, place, and time. She appears well-developed and well-nourished. No distress.       Cardiovascular:  Normal rate.             Pulmonary/Chest: Effort normal.        Abdominal: Soft. She exhibits abdominal incision. She exhibits no distension.                 Neurological: She is alert and oriented to person, place, and time.    Skin: Skin is warm and dry.    Psychiatric: She has a normal mood and affect.       Review  of Systems   Constitutional:  Negative for chills and fever.   Eyes:  Negative for visual disturbance.   Respiratory:  Negative for cough and wheezing.    Cardiovascular:  Negative for chest pain and palpitations.   Gastrointestinal:  Negative for abdominal pain, nausea and vomiting.   Genitourinary:  Negative for dysuria, frequency, hematuria, pelvic pain, vaginal bleeding, vaginal discharge and vaginal pain.   Neurological:  Negative for headaches.   Psychiatric/Behavioral:  Negative for depression.

## 2024-01-30 ENCOUNTER — HOSPITAL ENCOUNTER (EMERGENCY)
Facility: HOSPITAL | Age: 29
Discharge: HOME | End: 2024-01-30
Attending: EMERGENCY MEDICINE
Payer: COMMERCIAL

## 2024-01-30 ENCOUNTER — NURSE TRIAGE (OUTPATIENT)
Dept: ADMINISTRATIVE | Facility: CLINIC | Age: 29
End: 2024-01-30
Payer: COMMERCIAL

## 2024-01-30 VITALS
SYSTOLIC BLOOD PRESSURE: 116 MMHG | TEMPERATURE: 100 F | BODY MASS INDEX: 30.53 KG/M2 | HEART RATE: 106 BPM | WEIGHT: 190 LBS | DIASTOLIC BLOOD PRESSURE: 69 MMHG | RESPIRATION RATE: 18 BRPM | OXYGEN SATURATION: 100 % | HEIGHT: 66 IN

## 2024-01-30 DIAGNOSIS — R22.0 RIGHT FACIAL SWELLING: Primary | ICD-10-CM

## 2024-01-30 DIAGNOSIS — J32.0 MAXILLARY SINUSITIS, UNSPECIFIED CHRONICITY: ICD-10-CM

## 2024-01-30 PROCEDURE — 99283 EMERGENCY DEPT VISIT LOW MDM: CPT

## 2024-01-30 RX ORDER — AMOXICILLIN AND CLAVULANATE POTASSIUM 875; 125 MG/1; MG/1
1 TABLET, FILM COATED ORAL 2 TIMES DAILY
Qty: 14 TABLET | Refills: 0 | Status: SHIPPED | OUTPATIENT
Start: 2024-01-30 | End: 2024-04-30

## 2024-01-30 NOTE — ED PROVIDER NOTES
Encounter Date: 2024       History     Chief Complaint   Patient presents with    Oral Swelling    Facial Swelling     Pt reports tongue, right sided lip and right cheek swelling and pain since Monday. Pt reports having a  on , discharged home on  and was prescribed norco and ibuprofen. Pt reports calling the 24/7 triage nurse this morning and was told to come to the ED due to possible allergic reaction to the medication. Pt denies throat involvement or trouble breathing or swallowing.      29-year-old female status post  2024 presents for evaluation of right cheek and tongue swelling beginning yesterday.  Patient was discharged from hospital 2 days ago with prescription for Norco 5 and ibuprofen 800 mg for her pain.  She started taking her pain medications yesterday.  Later that day, patient began to notice swelling in her right cheek and tongue.  It did not affect her airway or her ability to swallow.  Today, she noted that swelling has progressed, and the swelling over her right cheek is now tender.  She denies any fevers, neck pain, changes in vision, difficulty breathing, difficulty swallowing, tooth pain, itching, intolerance of hot or cold liquids, any other symptoms.  Patient is breastfeeding her child.  She has taken Tylenol and ibuprofen before without any side effects.    The history is provided by the patient and the spouse.     Review of patient's allergies indicates:  No Known Allergies  Past Medical History:   Diagnosis Date    History of abnormal cervical Pap smear     History of HPV infection      Past Surgical History:   Procedure Laterality Date     SECTION  2018     SECTION N/A 2024    Procedure:  SECTION;  Surgeon: Aidee Scanlon MD;  Location: [facility] L[facility] OR;  Service: OB/GYN;  Laterality: N/A;     Family History   Problem Relation Age of Onset    Hypertension Mother     Diabetes Mother      Social History     Tobacco Use     Smoking status: Never   Substance Use Topics    Alcohol use: No    Drug use: No         Physical Exam     Initial Vitals [24 0815]   BP Pulse Resp Temp SpO2   (!) 149/93 (!) 124 18 99.5 °F (37.5 °C) 99 %      MAP       --         Physical Exam    Nursing note and vitals reviewed.  Constitutional: She appears well-developed and well-nourished.   HENT:   Right Ear: Tympanic membrane normal.   Left Ear: Tympanic membrane normal.   Mouth/Throat: Uvula is midline. Normal dentition. No dental caries.   Tongue swollen without laceration or erythema  Tenderness to palpation of right cheek and gums.  Tenderness on dental percussion.  Right nasal turbinates swollen   Eyes: Conjunctivae and EOM are normal. Pupils are equal, round, and reactive to light.   Neck: Neck supple. No thyromegaly present. No tracheal deviation present.   Normal range of motion.  Cardiovascular:  Normal rate, regular rhythm and normal heart sounds.           Pulmonary/Chest: Breath sounds normal. No stridor. No respiratory distress.   Abdominal: Abdomen is soft. There is no abdominal tenderness.   Musculoskeletal:      Cervical back: Normal range of motion and neck supple.     Lymphadenopathy:     She has no cervical adenopathy.   Neurological: She is oriented to person, place, and time. No cranial nerve deficit or sensory deficit.         ED Course   Procedures  Labs Reviewed - No data to display       Imaging Results    None          Medications - No data to display  Medical Decision Making  29-year-old female who was 4 days post  presents for evaluation of right face and cheek swelling.    Pathologies I have considered in my differential include, but are not limited to, anaphylaxis, angioedema, Mariano's angina, sinus infection, cellulitis, buccal abscess, parotitis, dental infection.    Low suspicion for anaphylaxis:  No itching, no hives, patient with previous exposure to ibuprofen and acetaminophen without incident.   Additionally, swelling is tender.    Low concern for angioedema:  Previous use of ibuprofen and acetaminophen without incident, angioedema not typically associated with medications patient is taking, swelling is tender.    On further questioning, patient relates that she has had sinus issues throughout her pregnancy, which were thought to be purely pregnancy related.  Given history of sinus issues and tenderness to gums and cheek on exam, suspect sinus infection.  Patient is stable for discharge with Augmentin prescription.  Return precautions given.  All questions answered.  Patient and spouse are comfortable with plan.    Risk  OTC drugs.  Prescription drug management.  Risk Details: Patient discharged with prescription for Augmentin.  Advised on continued use of prescribed ibuprofen and OTC Tylenol for pain control.              Attending Attestation:   Physician Attestation Statement for Resident:  As the supervising MD   Physician Attestation Statement: I have personally seen and examined this patient.   I agree with the above history.  -:   As the supervising MD I agree with the above PE.     As the supervising MD I agree with the above treatment, course, plan, and disposition.   -: Pt has no dental abnormalities. Less likely but apical odontogenic abscess not entirely ruled out. Pt reports Sinus congestion/ pain/ symptoms of sinusitis consistently for months. Told it was rhinitis of pregnancy. Symptoms still present after recent delivery. May still be rhinitis of pregnancy but I favor right midface swelling being related to chronic sinusitis. No erythema. No induration. No ocular involvement. Pt does have swelling in nares and significantly enlarged turbinates. Will treat with augmentin. Return for fever, Any eye lid swelling, Or any headaches.                                           Clinical Impression:  Final diagnoses:  [J32.0] Maxillary sinusitis, unspecified chronicity  [R22.0] Right facial swelling  (Primary)          ED Disposition Condition    Discharge Stable          ED Prescriptions       Medication Sig Dispense Start Date End Date Auth. Provider    amoxicillin-clavulanate 875-125mg (AUGMENTIN) 875-125 mg per tablet Take 1 tablet by mouth 2 (two) times daily. 14 tablet 1/30/2024 -- Wagner Osborne MD          Follow-up Information    None          Wagner Osborne MD  Resident  01/30/24 1500       Mack Valle MD  02/04/24 0001

## 2024-01-30 NOTE — DISCHARGE SUMMARY
[facility] [place] [facility]  Obstetrics  Discharge Summary      Patient Name: Michelle Burns  MRN: [id number]  Admission Date: 2024  Hospital Length of Stay: 2 days  Discharge Date and Time: 2024  1:00 PM  Attending Physician: Iliana att. providers found   Discharging Provider: Aidee Scanlon MD   Primary Care Provider: Iliana Primary Doctor    HPI:  Michelle Burns is a 29 y.o.  female with IUP at 39w0d weeks gestation who is admitted for scheduled  Section secondary to history of  section.     This IUP is complicated by history of  section.  Patient reports contractions, denies vaginal bleeding, denies LOF.   Fetal Movement: normal.         Procedure(s) (LRB):   SECTION (N/A)     Hospital Course:   2024: POD#1 s/p RLTCS. She is ambulating, voiding, and tolerating PO. She reports that her pain is controlled. She denies heavy lochia. She is breast feeding her female infant.   2024: POD#2 s/p RLTCS. She is ambulating, voiding, and tolerating PO. She reports that her pain is controlled. She denies heavy lochia. She is breast feeding her female infant.          Final Active Diagnoses:    Diagnosis Date Noted POA    PRINCIPAL PROBLEM:  Status post primary low transverse  section [Z98.891] 2024 Not Applicable      Problems Resolved During this Admission:    Diagnosis Date Noted Date Resolved POA    History of  section [Z98.891] 08/10/2023 2024 Not Applicable        Significant Diagnostic Studies: Labs: CBC   Recent Labs   Lab 24  0554   WBC 7.51   HGB 7.8*   HCT 26.5*   *         Feeding Method: breast    Immunizations       Date Immunization Status Dose Route/Site Given by    24 1203 MMR Deferred 0.5 mL Subcutaneous/ Marcy Whalen RN    24 1204 Tdap Deferred 0.5 mL Intramuscular/ Marcy Whalen RN    24 0908 Influenza - Quadrivalent - PF *Preferred* (6 months and older) Deleted 0.5 mL  "Intramuscular/     24 0857 Influenza - Quadrivalent - PF *Preferred* (6 months and older) Given 0.5 mL Intramuscular/Right deltoid Marcy Whalen RN            Delivery:    Episiotomy: None   Lacerations: None   Repair suture: None   Repair # of packets:     Blood loss (ml):       Birth information:  YOB: 2023   Time of birth: 12:09 PM   Sex: female   Delivery type: , Low Transverse   Gestational Age: 39w0d     Measurements    Weight: 3210 g  Weight (lbs): 7 lb 1.2 oz  Length: 48.9 cm  Length (in): 19.25"         Delivery Clinician: Delivery Providers    Delivering clinician: Aidee Scanlon MD   Provider Role    Ynes Brown RN Delivery Nurse    Maxwell Bell, CRNA Nurse Anesthetist    Zulema Vazquez Scrub Person    Adelina Salazar RN Registered Nurse    Gladis Ogden, NNP Nurse Practitioner    Amarilys Raygoza MD Assisting Surgeon    Augustin Bruno MD Anesthesiologist             Additional  information:  Forceps:    Vacuum:    Breech:    Observed anomalies      Living?:     Apgars    Living status: Living  Apgar Component Scores:  1 min.:  5 min.:  10 min.:  15 min.:  20 min.:    Skin color:  1  1       Heart rate:  2  2       Reflex irritability:  2  2       Muscle tone:  2  2       Respiratory effort:  2  2       Total:  9  9       Apgars assigned by: ADELINA SALAZAR RN         Placenta: Delivered:       appearance  Pending Diagnostic Studies:       None            Discharged Condition: good    Disposition: Home or Self Care    Follow Up:   Follow-up Information       Aidee Scanlon MD Follow up in 1 week(s).    Specialty: Obstetrics and Gynecology  Why: post-op check  Contact information:  [address]  [address] [place] [id number]  [phone]               Aidee Scanlon MD Follow up in 6 week(s).    Specialty: Obstetrics and Gynecology  Why: post partum  Contact information:  [address]  [address]  [facility] " [id number]  [phone]                           Patient Instructions:      BREAST PUMP FOR HOME USE     Order Specific Question Answer Comments   Type of pump: Electric    Weight: 86.2 kg (190 lb)    Length of need (1-99 months): 99      Diet Adult Regular     Pelvic Rest     Lifting restrictions     Notify your health care provider if you experience any of the following:  temperature >100.4     Notify your health care provider if you experience any of the following:  persistent nausea and vomiting or diarrhea     Notify your health care provider if you experience any of the following:  severe uncontrolled pain     Notify your health care provider if you experience any of the following:  redness, tenderness, or signs of infection (pain, swelling, redness, odor or green/yellow discharge around incision site)     Notify your health care provider if you experience any of the following:  difficulty breathing or increased cough     Notify your health care provider if you experience any of the following:  severe persistent headache     Notify your health care provider if you experience any of the following:  worsening rash     Notify your health care provider if you experience any of the following:  persistent dizziness, light-headedness, or visual disturbances     Notify your health care provider if you experience any of the following:  increased confusion or weakness     Leave dressing on - Keep it clean, dry, and intact until clinic visit     Activity as tolerated     Weight bearing restrictions (specify):     Medications:  Discharge Medication List as of 1/28/2024 12:05 PM        START taking these medications    Details   HYDROcodone-acetaminophen (NORCO) 5-325 mg per tablet Take 1 tablet by mouth every 8 (eight) hours as needed for Pain., Starting Fri 1/26/2024, Normal      ibuprofen (ADVIL,MOTRIN) 800 MG tablet Take 1 tablet (800 mg total) by mouth every 8 (eight) hours as needed for Pain., Starting Fri 1/26/2024,  Normal           CONTINUE these medications which have NOT CHANGED    Details   PNV,calcium 72-iron-folic acid (PRENATAL VITAMIN PLUS LOW IRON) 27 mg iron- 1 mg Tab Take one tablet daily.  Prescribe prenatal covered by insurance, Normal           STOP taking these medications       ondansetron (ZOFRAN-ODT) 4 MG TbDL Comments:   Reason for Stopping:         promethazine (PHENERGAN) 12.5 MG Supp Comments:   Reason for Stopping:               Aidee Scanlon MD  Obstetrics  [facility] [place] [facility]

## 2024-01-30 NOTE — TELEPHONE ENCOUNTER
Caller states she is 4 days post op. Caller state she has facial and tongue swelling s/p taking hydrocodone...    Call ended pt called back on both numbers, LVM for patient to call back.      Reason for Disposition   Caller has cancelled the call before the first contact    Additional Information   Negative: Caller has already spoken with the PCP and has no further questions.   Negative: Caller has already spoken with another triager and has no further questions.   Negative: Caller has already spoken with another triager or PCP AND has further questions AND triager able to answer questions.   Negative: Busy signal.  First attempt to contact caller.  Follow-up call scheduled within 15 minutes.   Negative: No answer.  First attempt to contact caller.  Follow-up call scheduled within 15 minutes.   Negative: Message left on identified voice mail   Negative: Message left on unidentified voice mail.  Phone number verified.   Negative: Message left with person in household.   Negative: Wrong number reached.  Phone number verified.   Negative: Second attempt to contact caller AND no contact made. Phone number verified.   Negative: Third attempt to contact caller AND no contact made. Phone number verified.   Negative: Cell phone out of range.  Phone number verified.   Negative: Pager number given.  Answering service notified.   Negative: Patient already left for the hospital/clinic.   Negative: Unable to complete triage due to phone connection issues   Negative: NON-URGENT call redirected to PCP's office because it is open    Protocols used: No Contact or Duplicate Contact Call-A-

## 2024-01-30 NOTE — DISCHARGE INSTRUCTIONS
You were evaluated for right face and lip swelling.  You have not had any difficulties breathing or swallowing.  In light your chronic sinus issues during pregnancy, it is likely that you have an infection of one of your sinuses.    Take Augmentin, 1 tablet by mouth, twice daily for 1 week.    For pain, continue with prescribed ibuprofen as directed.    Follow up with your PCP within 3-5 days.    Return to the emergency department for any new or worsening shortness of breath, difficulty swallowing, high-pitched breathing noises, fevers greater than 100.4° F, difficulty opening your mouth, worsening headache, new neck pain, any new or worsening symptoms.

## 2024-01-30 NOTE — ED NOTES
Pt c/o swelling to right side of face, lips and tongue x1 day. Pt showed no obvious signs of distress, and denied difficulty breathing. Pt able to speak in full complete sentences without difficulty. Pt stated pain is 5/10. Pt denied chest pain, SOB or NVD. No neuro deficits noted.Pt GCS 15, AAO x4. Pt is breastfeeding and complains of 4/10 pain to bilateral breast. Pt stated she last took pain medications approximately 10 pm last night. VSS stable.

## 2024-01-30 NOTE — TELEPHONE ENCOUNTER
OOC RN   Post op C Section.  1/26/24  Dr. Scanlon  Was given medication for pain.   Right side of face started getting puffy yesterdan and tongue swollen swelling started this morning.    Last med was taken   Last night 800 Hydrocodone.  Denies swallowing problems, denies sob.  Puffy Face started the evening she went home and took dc'd 1/28/24  and took first hydrocodone then.  Care Advise is to call 911   Reason for Disposition   Facial swelling    Additional Information   Negative: [1] Intentional drug overdose AND [2] suicidal thoughts or ideas   Negative: MORE THAN A DOUBLE DOSE of a prescription or over-the-counter (OTC) drug   Negative: [1] DOUBLE DOSE (an extra dose or lesser amount) of prescription drug AND [2] any symptoms (e.g., dizziness, nausea, pain, sleepiness)   Negative: [1] DOUBLE DOSE (an extra dose or lesser amount) of over-the-counter (OTC) drug AND [2] any symptoms (e.g., dizziness, nausea, pain, sleepiness)   Negative: Took another person's prescription drug   Negative: Started suddenly after sting from bee, wasp, or yellow jacket   Negative: Started suddenly after taking a medicine or allergic food (e.g., nuts)   Negative: Wheezing, stridor, hoarseness, or difficulty breathing    Protocols used: Medication Question Call-A-AH, Tongue Swelling-A-

## 2024-02-01 ENCOUNTER — POSTPARTUM VISIT (OUTPATIENT)
Dept: OBSTETRICS AND GYNECOLOGY | Facility: CLINIC | Age: 29
End: 2024-02-01
Payer: COMMERCIAL

## 2024-02-01 ENCOUNTER — TELEPHONE (OUTPATIENT)
Dept: OBSTETRICS AND GYNECOLOGY | Facility: HOSPITAL | Age: 29
End: 2024-02-01
Payer: COMMERCIAL

## 2024-02-01 VITALS
DIASTOLIC BLOOD PRESSURE: 70 MMHG | WEIGHT: 187.06 LBS | SYSTOLIC BLOOD PRESSURE: 122 MMHG | BODY MASS INDEX: 30.19 KG/M2

## 2024-02-01 DIAGNOSIS — Z51.89 VISIT FOR WOUND CHECK: Primary | ICD-10-CM

## 2024-02-01 PROCEDURE — 99999 PR PBB SHADOW E&M-EST. PATIENT-LVL III: CPT | Mod: PBBFAC,,,

## 2024-02-01 PROCEDURE — 0503F POSTPARTUM CARE VISIT: CPT | Mod: CPTII,S$GLB,,

## 2024-02-01 NOTE — PROGRESS NOTES
Postpartum Visit    CC:   Chief Complaint   Patient presents with    Postpartum Care       Michelle Burns is a 29 y.o. female  is here for a postpartum visit. She is 1 weeks postpartum following a low  transverse  section, of a female infant weighinlb 1.2oz. The delivery was at 39w 0d.   Reports that she is doing well with breastfeeding/ pumping. Pregnancy was complicated by: repeat c/s.      She is currently on antibiotics for maxillary sinusitis.     She denies uncontrolled pain, purulent drainage, redness, warmth, fever.     She is accompanied today with her significant other and baby girl  OB History    Para Term  AB Living   4 2 2   2 2   SAB IAB Ectopic Multiple Live Births         0 1      # Outcome Date GA Lbr Car/2nd Weight Sex Delivery Anes PTL Lv   4 Term 24 39w0d  3.21 kg (7 lb 1.2 oz) F CS-LTranv Spinal N RAINE   3 Term 2018     CS-LTranv      2 AB            1 AB                ROS:  GENERAL: No fever, chills, fatigability.  VULVAR: No pain, no lesions and no itching.  VAGINAL: No relaxation, no itching, no discharge, no abnormal bleeding and no lesions.  ABDOMEN: No abdominal pain. Denies nausea. Denies vomiting. No diarrhea. No constipation.    BREAST: Denies pain. No lumps. No discharge.  URINARY: No incontinence, no nocturia, no frequency and no dysuria.  CARDIOVASCULAR: No chest pain. No shortness of breath. No leg cramps.  NEUROLOGICAL: No headaches. No vision changes.    PHYSICAL EXAM:   General appearance - alert, well appearing, and in no distress, oriented to person, place, and time, and normal appearing weight  Mental status - alert, oriented to person, place, and time, normal mood, behavior, speech, dress, motor activity, and thought processes  Skin - coloration normal for race, good turgor, warm to touch, no rashes  Pfannenstiel incision with AquaCel dressing.  No evidence of active bleeding.  Dressing removed.  Incision is clean, dry, intact.   Healing well without any evidence of infection.  Extremities - no edema, redness or tenderness in the lower extremities or calves       PLAN:  1. Visit for wound check  Watch your incision for signs of infection, such as increasing redness or drainage, or a foul smelling odor.  For ease of movement, hold a pillow against the incision when you get up from a lying or sitting position, and when you laugh or cough.  Avoid heavy lifting--nothing heavier than your baby until your doctor instructs you otherwise.  May follow a regular diet, unless otherwise discussed with physician.  Take showers, not baths unless otherwise discussed with physician.  Activity as tolerated.  No lifting or heavy exercise for 6 weeks, no driving for 2 weeks  No sexual intercourse, douching or tampons for 6 weeks    6 weeks PP visit on 3/15 with Dr Junito Eaton, [facility]

## 2024-02-01 NOTE — TELEPHONE ENCOUNTER
Spoke to pt who states baby breastfeeding well at home and having plenty of wet and dirty diapers --denies any breast or nipple pain -states nipples dry and cracking but applies EBM to nipples and that works well for healing -saw pediatrician and visit went well -has no concerns for us

## 2024-02-04 ENCOUNTER — PATIENT MESSAGE (OUTPATIENT)
Dept: OBSTETRICS AND GYNECOLOGY | Facility: CLINIC | Age: 29
End: 2024-02-04
Payer: COMMERCIAL

## 2024-03-15 ENCOUNTER — POSTPARTUM VISIT (OUTPATIENT)
Dept: OBSTETRICS AND GYNECOLOGY | Facility: CLINIC | Age: 29
End: 2024-03-15
Payer: COMMERCIAL

## 2024-03-15 VITALS
SYSTOLIC BLOOD PRESSURE: 124 MMHG | BODY MASS INDEX: 28.47 KG/M2 | DIASTOLIC BLOOD PRESSURE: 70 MMHG | WEIGHT: 176.38 LBS

## 2024-03-15 DIAGNOSIS — Z30.09 BIRTH CONTROL COUNSELING: ICD-10-CM

## 2024-03-15 PROCEDURE — 0503F POSTPARTUM CARE VISIT: CPT | Mod: CPTII,S$GLB,, | Performed by: OBSTETRICS & GYNECOLOGY

## 2024-03-15 PROCEDURE — 99999 PR PBB SHADOW E&M-EST. PATIENT-LVL IV: CPT | Mod: PBBFAC,,, | Performed by: OBSTETRICS & GYNECOLOGY

## 2024-03-15 RX ORDER — NORETHINDRONE 0.35 MG/1
1 TABLET ORAL DAILY
Qty: 90 TABLET | Refills: 3 | Status: SHIPPED | OUTPATIENT
Start: 2024-03-15 | End: 2025-03-15

## 2024-03-15 NOTE — PROGRESS NOTES
History & Physical  Gynecology      SUBJECTIVE:     Chief Complaint: Postpartum Care       History of Present Illness:  Michelle Burns is a 29 y.o. female  presents for a postpartum visit.  She is status post RLTCS  7 weeks ago.  Her hospitalization was not complicated.  She is breastfeeding.  She desires Minipill for contraception but plans for her  to have a vasectomy.  She denies postpartum depression.    Her last pap was normal.       Review of patient's allergies indicates:  No Known Allergies    Past Medical History:   Diagnosis Date    History of abnormal cervical Pap smear     History of HPV infection      Past Surgical History:   Procedure Laterality Date     SECTION       SECTION N/A 2024    Procedure:  SECTION;  Surgeon: Aidee Scanlon MD;  Location: [facility] OR;  Service: OB/GYN;  Laterality: N/A;     OB History          4    Para   2    Term   2            AB   2    Living   2         SAB        IAB        Ectopic        Multiple   0    Live Births   1               Family History   Problem Relation Age of Onset    Hypertension Mother     Diabetes Mother      Social History     Tobacco Use    Smoking status: Never   Substance Use Topics    Alcohol use: No    Drug use: No       Current Outpatient Medications   Medication Sig    amoxicillin-clavulanate 875-125mg (AUGMENTIN) 875-125 mg per tablet Take 1 tablet by mouth 2 (two) times daily.    HYDROcodone-acetaminophen (NORCO) 5-325 mg per tablet Take 1 tablet by mouth every 8 (eight) hours as needed for Pain.    ibuprofen (ADVIL,MOTRIN) 800 MG tablet Take 1 tablet (800 mg total) by mouth every 8 (eight) hours as needed for Pain.    norethindrone (MICRONOR) 0.35 mg tablet Take 1 tablet (0.35 mg total) by mouth once daily.    PNV,calcium 72-iron-folic acid (PRENATAL VITAMIN PLUS LOW IRON) 27 mg iron- 1 mg Tab Take one tablet daily.  Prescribe prenatal covered by insurance     No current  facility-administered medications for this visit.         Review of Systems:  Review of Systems   Constitutional:  Negative for chills and fever.   Eyes:  Negative for visual disturbance.   Respiratory:  Negative for cough and wheezing.    Cardiovascular:  Negative for chest pain and palpitations.   Gastrointestinal:  Negative for abdominal pain, nausea and vomiting.   Genitourinary:  Negative for dysuria, frequency, hematuria, pelvic pain, vaginal bleeding, vaginal discharge and vaginal pain.   Neurological:  Negative for headaches.   Psychiatric/Behavioral:  Negative for depression.         OBJECTIVE:     Physical Exam:  Physical Exam  Vitals and nursing note reviewed.   Constitutional:       Appearance: Normal appearance. She is well-developed.   Cardiovascular:      Rate and Rhythm: Normal rate.   Pulmonary:      Effort: Pulmonary effort is normal. No respiratory distress.   Abdominal:      General: There is no distension.      Palpations: Abdomen is soft.      Tenderness: There is no abdominal tenderness.   Genitourinary:     Exam position: Supine.   Skin:     General: Skin is warm and dry.   Neurological:      Mental Status: She is oriented to person, place, and time.           ASSESSMENT:       ICD-10-CM ICD-9-CM    1. Postpartum care and examination of lactating mother  Z39.1 V24.1       2. Birth control counseling  Z30.09 V25.09 norethindrone (MICRONOR) 0.35 mg tablet             Plan:      Michelle was seen today for postpartum care.    Diagnoses and all orders for this visit:    Postpartum care and examination of lactating mother  - Breast feeding  - No menses at this time  - Doing well  - Denies postpartum depression, denies SI/HI  - Progesterone only for birth control    Birth control counseling  -     norethindrone (MICRONOR) 0.35 mg tablet; Take 1 tablet (0.35 mg total) by mouth once daily.  - Discussed with patient the importance of taking her birth control pill everyday. She understands to start pills  after her next period. She understands that if she skips one pill that she should take it as soon as possible but if she skips 2 pills she should resume pills as soon as possible and use condoms/abstience for the following 7 days. If she missed >2 doses than she should stop taking pills, have a period then start a new pack.    No orders of the defined types were placed in this encounter.      Follow up in about 1 year (around 3/15/2025) for Well Woman/Annual.     Counseling time: 15 minutes    Aidee Scanlon

## 2024-04-30 ENCOUNTER — E-VISIT (OUTPATIENT)
Dept: OBSTETRICS AND GYNECOLOGY | Facility: CLINIC | Age: 29
End: 2024-04-30
Payer: COMMERCIAL

## 2024-04-30 DIAGNOSIS — R39.9 UTI SYMPTOMS: ICD-10-CM

## 2024-04-30 DIAGNOSIS — N76.0 ACUTE VAGINITIS: Primary | ICD-10-CM

## 2024-04-30 PROCEDURE — 99421 OL DIG E/M SVC 5-10 MIN: CPT | Mod: ,,,

## 2024-04-30 RX ORDER — FLUCONAZOLE 150 MG/1
150 TABLET ORAL DAILY
Qty: 1 TABLET | Refills: 0 | Status: SHIPPED | OUTPATIENT
Start: 2024-04-30 | End: 2024-05-01

## 2024-04-30 RX ORDER — METRONIDAZOLE 500 MG/1
500 TABLET ORAL 2 TIMES DAILY
Qty: 14 TABLET | Refills: 0 | Status: SHIPPED | OUTPATIENT
Start: 2024-04-30 | End: 2024-05-07

## 2024-04-30 NOTE — PROGRESS NOTES
Patient ID: Michelle Burns is a 29 y.o. female.    Chief Complaint: Vaginal Discharge (Entered automatically based on patient selection in Patient Portal.), Urinary Urgency, and Urinary Frequency    The patient initiated a request through [employer] on 4/30/2024 for evaluation and management with a chief complaint of Vaginal Discharge (Entered automatically based on patient selection in Patient Portal.), Urinary Urgency, and Urinary Frequency     I evaluated the questionnaire submission on 4/30/2024.    Ohs Peq Evisit Vaginal Discharge    4/30/2024 12:07 PM CDT - Filed by Patient   Do you agree to participate in an E-Visit? Yes   If you have any of the following symptoms,  please do not complete an E-Visit,  schedule an appointment with your provider: I acknowledge   What is the main issue you would like addressed today? Vaginal itching, light spotting, and pressure at end of urinating   Are you able to take your vital signs? No   Are you pregnant, could you be pregnant, or are you breast feeding? Breast feeding   Which of the following are you experiencing? Vaginal Itching;  Vaginal discharge;  Vaginal bleeding    Are you having pain while passing urine? No, I have no pain while urinating.   Which of the following applies to your vaginal discharge? I have a white/milky discharge.    Which of the following are you experiencing? Frequent urination   Do you have any sores on your genitals? No    Have you taken antibiotics recently? I have not been on any antibiotics    Do you use any of the following? Bubble baths;  None of the above   Which of the following applies to your menstrual period? I have not had a menstrual period for a while.   Have you had similar symptoms in the past? No, I have never had these symptoms.   Have you had a fever? No   During the last 2 months, have you had sexual contact with a specific person for the first time? No   Provide any additional information you feel is important. I did a 7 day  monistat treatment and have been drinking cranberry juice and water   Please attach any relevant images or files          Encounter Diagnoses   Name Primary?    Acute vaginitis Yes    UTI symptoms         Orders Placed This Encounter   Procedures    Urinalysis, Reflex to Urine Culture Urine, Clean Catch     Standing Status:   Future     Standing Expiration Date:   2025     Order Specific Question:   Preferred Collection Type     Answer:   Urine, Clean Catch     Order Specific Question:   Specimen Source     Answer:   Urine      Medications Ordered This Encounter   Medications    fluconazole (DIFLUCAN) 150 MG Tab     Sig: Take 1 tablet (150 mg total) by mouth once daily. for 1 day     Dispense:  1 tablet     Refill:  0    metroNIDAZOLE (FLAGYL) 500 MG tablet     Sig: Take 1 tablet (500 mg total) by mouth 2 (two) times daily. for 7 days     Dispense:  14 tablet     Refill:  0        No follow-ups on file.      E-Visit Time Trackin

## (undated) DEVICE — DRESSING AQUACEL ADH 4X10IN

## (undated) DEVICE — SYS KIWI DELIVERY OMNICUP VAC

## (undated) DEVICE — DRESSING ABD OPEN GRANUFOAM